# Patient Record
Sex: MALE | Race: BLACK OR AFRICAN AMERICAN | NOT HISPANIC OR LATINO | Employment: FULL TIME | URBAN - METROPOLITAN AREA
[De-identification: names, ages, dates, MRNs, and addresses within clinical notes are randomized per-mention and may not be internally consistent; named-entity substitution may affect disease eponyms.]

---

## 2023-03-10 ENCOUNTER — OFFICE VISIT (OUTPATIENT)
Dept: PRIMARY CARE | Facility: CLINIC | Age: 48
End: 2023-03-10
Payer: COMMERCIAL

## 2023-03-10 VITALS
DIASTOLIC BLOOD PRESSURE: 76 MMHG | BODY MASS INDEX: 50.29 KG/M2 | WEIGHT: 312.9 LBS | HEIGHT: 66 IN | HEART RATE: 72 BPM | SYSTOLIC BLOOD PRESSURE: 118 MMHG

## 2023-03-10 DIAGNOSIS — I10 PRIMARY HYPERTENSION: ICD-10-CM

## 2023-03-10 DIAGNOSIS — E66.01 OBESITY, MORBID, BMI 50 OR HIGHER (MULTI): ICD-10-CM

## 2023-03-10 DIAGNOSIS — R73.03 PREDIABETES: ICD-10-CM

## 2023-03-10 DIAGNOSIS — G47.33 OBSTRUCTIVE SLEEP APNEA SYNDROME: Primary | ICD-10-CM

## 2023-03-10 DIAGNOSIS — E78.49 OTHER HYPERLIPIDEMIA: ICD-10-CM

## 2023-03-10 LAB — POC FINGERSTICK BLOOD GLUCOSE: 121 MG/DL (ref 70–100)

## 2023-03-10 PROCEDURE — 99204 OFFICE O/P NEW MOD 45 MIN: CPT | Performed by: INTERNAL MEDICINE

## 2023-03-10 PROCEDURE — 82962 GLUCOSE BLOOD TEST: CPT | Performed by: INTERNAL MEDICINE

## 2023-03-10 PROCEDURE — 3078F DIAST BP <80 MM HG: CPT | Performed by: INTERNAL MEDICINE

## 2023-03-10 PROCEDURE — 3074F SYST BP LT 130 MM HG: CPT | Performed by: INTERNAL MEDICINE

## 2023-03-10 RX ORDER — LISINOPRIL AND HYDROCHLOROTHIAZIDE 10; 12.5 MG/1; MG/1
1 TABLET ORAL DAILY
COMMUNITY
Start: 2022-12-09 | End: 2023-06-23 | Stop reason: SDUPTHER

## 2023-03-10 RX ORDER — ATORVASTATIN CALCIUM 10 MG/1
10 TABLET, FILM COATED ORAL NIGHTLY
COMMUNITY
Start: 2022-12-09 | End: 2023-06-23 | Stop reason: DRUGHIGH

## 2023-03-10 ASSESSMENT — ENCOUNTER SYMPTOMS
BLOOD IN STOOL: 0
ANAL BLEEDING: 0
DYSURIA: 0
CHEST TIGHTNESS: 0
WEAKNESS: 0
NUMBNESS: 0
EYE REDNESS: 0
ABDOMINAL DISTENTION: 0
EYE ITCHING: 0
CHOKING: 0
TREMORS: 0
FACIAL ASYMMETRY: 0
BRUISES/BLEEDS EASILY: 0
JOINT SWELLING: 0
APPETITE CHANGE: 0
CHILLS: 0
LIGHT-HEADEDNESS: 0
FLANK PAIN: 0
DIAPHORESIS: 0
BACK PAIN: 0
DIARRHEA: 0
FREQUENCY: 0
EYE DISCHARGE: 0
DIFFICULTY URINATING: 0
WOUND: 0
POLYPHAGIA: 0
RHINORRHEA: 0
ACTIVITY CHANGE: 0
POLYDIPSIA: 0
NECK PAIN: 0
SLEEP DISTURBANCE: 0
SINUS PAIN: 0
TROUBLE SWALLOWING: 0
PALPITATIONS: 0
HEMATURIA: 0
SEIZURES: 0
NAUSEA: 0
EYE PAIN: 0
PHOTOPHOBIA: 0
SORE THROAT: 0
FACIAL SWELLING: 0
COUGH: 0
WHEEZING: 0
RECTAL PAIN: 0
SHORTNESS OF BREATH: 0
ADENOPATHY: 0
STRIDOR: 0
MYALGIAS: 0
VOMITING: 0
NECK STIFFNESS: 0
SINUS PRESSURE: 0
ABDOMINAL PAIN: 0
HEADACHES: 0
DIZZINESS: 0
CONSTIPATION: 0
SPEECH DIFFICULTY: 0
FATIGUE: 0
COLOR CHANGE: 0
ARTHRALGIAS: 1
VOICE CHANGE: 0

## 2023-03-10 NOTE — PROGRESS NOTES
Subjective   Patient ID: Humberto Rivas is a 47 y.o. male who presents for Establish Care.    Patient is a 47-year-old male with history of hypertension, hyperlipidemia, sleep apnea,, prediabetes and he overall feels well.  He denies any headaches, no dizziness, sinus problems, no chest pain or shortness of breath.  Denies abdominal pain no nausea vomiting or diarrhea.  Does complain of occasional knee pain.  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  He has been compliant with his medications but not diet or exercise.  He has gained some weight.         Review of Systems   Constitutional:  Negative for activity change, appetite change, chills, diaphoresis and fatigue.   HENT:  Negative for congestion, dental problem, drooling, ear discharge, ear pain, facial swelling, hearing loss, mouth sores, nosebleeds, postnasal drip, rhinorrhea, sinus pressure, sinus pain, sneezing, sore throat, tinnitus, trouble swallowing and voice change.    Eyes:  Negative for photophobia, pain, discharge, redness, itching and visual disturbance.   Respiratory:  Negative for cough, choking, chest tightness, shortness of breath, wheezing and stridor.    Cardiovascular:  Negative for chest pain, palpitations and leg swelling.   Gastrointestinal:  Negative for abdominal distention, abdominal pain, anal bleeding, blood in stool, constipation, diarrhea, nausea, rectal pain and vomiting.   Endocrine: Negative for cold intolerance, heat intolerance, polydipsia, polyphagia and polyuria.   Genitourinary:  Negative for decreased urine volume, difficulty urinating, dysuria, enuresis, flank pain, frequency, genital sores, hematuria and urgency.   Musculoskeletal:  Positive for arthralgias (Left knee pain). Negative for back pain, gait problem, joint swelling, myalgias, neck pain and neck stiffness.   Skin:  Negative for color change, pallor, rash and wound.   Neurological:  Negative for dizziness, tremors, seizures, syncope, facial asymmetry,  "speech difficulty, weakness, light-headedness, numbness and headaches.   Hematological:  Negative for adenopathy. Does not bruise/bleed easily.   Psychiatric/Behavioral:  Negative for sleep disturbance.        Objective   Ht 1.676 m (5' 6\")   Wt (!) 142 kg (312 lb 14.4 oz)   BMI 50.50 kg/m²     Physical Exam  Cardiovascular:      Rate and Rhythm: Normal rate and regular rhythm.      Heart sounds: No murmur heard.     No friction rub. No gallop.   Pulmonary:      Effort: No respiratory distress.      Breath sounds: No wheezing or rales.   Abdominal:      General: There is no distension.      Palpations: There is no mass.      Tenderness: There is no abdominal tenderness. There is no guarding.   Musculoskeletal:      Right lower leg: No edema.      Left lower leg: No edema.   Neurological:      Mental Status: He is alert.         Assessment/Plan   Diagnoses and all orders for this visit:  Obstructive sleep apnea syndrome-he will use a CPAP nightly.  Prediabetes-hemoglobin A1c was 6.9 previously.  He will watch his diet and exercise.  He will consider medications.  Will refer to endocrinology  -     POCT Fingerstick Glucose manually resulted  Primary hypertension-stable on present medications.  Other hyperlipidemia-we will continue with atorvastatin.  We will schedule cardiac score  -     CT cardiac scoring wo IV contrast; Future  Obesity, morbid, BMI 50 or higher (CMS/HCC-we will refer to endocrinology...  Diet and exercise  Health maintenance-colonoscopy has been done.  We will obtain previous immunizations       "

## 2023-03-10 NOTE — PATIENT INSTRUCTIONS
Please take medication as prescribed.  Diet and exercise.  Follow-up in 3 months.  Schedule appointment with endocrinology.  Schedule your calcium score.  Diet and exercise.

## 2023-05-26 LAB
ESTIMATED AVERAGE GLUCOSE FOR HBA1C: 169 MG/DL
HEMOGLOBIN A1C/HEMOGLOBIN TOTAL IN BLOOD: 7.5 %
THYROTROPIN (MIU/L) IN SER/PLAS BY DETECTION LIMIT <= 0.05 MIU/L: 2.15 MIU/L (ref 0.44–3.98)

## 2023-06-23 ENCOUNTER — OFFICE VISIT (OUTPATIENT)
Dept: PRIMARY CARE | Facility: CLINIC | Age: 48
End: 2023-06-23
Payer: COMMERCIAL

## 2023-06-23 ENCOUNTER — LAB (OUTPATIENT)
Dept: LAB | Facility: LAB | Age: 48
End: 2023-06-23
Payer: COMMERCIAL

## 2023-06-23 VITALS
WEIGHT: 302 LBS | DIASTOLIC BLOOD PRESSURE: 78 MMHG | BODY MASS INDEX: 48.74 KG/M2 | SYSTOLIC BLOOD PRESSURE: 114 MMHG | HEART RATE: 72 BPM

## 2023-06-23 DIAGNOSIS — G47.33 OBSTRUCTIVE SLEEP APNEA SYNDROME: ICD-10-CM

## 2023-06-23 DIAGNOSIS — I10 PRIMARY HYPERTENSION: ICD-10-CM

## 2023-06-23 DIAGNOSIS — R73.03 PREDIABETES: ICD-10-CM

## 2023-06-23 DIAGNOSIS — E78.49 OTHER HYPERLIPIDEMIA: Primary | ICD-10-CM

## 2023-06-23 LAB
APPEARANCE, URINE: NORMAL
BILIRUBIN, URINE: NEGATIVE
BLOOD, URINE: NEGATIVE
COLOR, URINE: YELLOW
GLUCOSE, URINE: NEGATIVE MG/DL
KETONES, URINE: NEGATIVE MG/DL
LEUKOCYTE ESTERASE, URINE: NEGATIVE
NITRITE, URINE: NEGATIVE
PH, URINE: 5 (ref 5–8)
PROTEIN, URINE: NEGATIVE MG/DL
SPECIFIC GRAVITY, URINE: 1.03 (ref 1–1.03)
UROBILINOGEN, URINE: <2 MG/DL (ref 0–1.9)

## 2023-06-23 PROCEDURE — 3074F SYST BP LT 130 MM HG: CPT | Performed by: INTERNAL MEDICINE

## 2023-06-23 PROCEDURE — 81003 URINALYSIS AUTO W/O SCOPE: CPT

## 2023-06-23 PROCEDURE — 99396 PREV VISIT EST AGE 40-64: CPT | Performed by: INTERNAL MEDICINE

## 2023-06-23 PROCEDURE — 4004F PT TOBACCO SCREEN RCVD TLK: CPT | Performed by: INTERNAL MEDICINE

## 2023-06-23 PROCEDURE — 93000 ELECTROCARDIOGRAM COMPLETE: CPT | Performed by: INTERNAL MEDICINE

## 2023-06-23 PROCEDURE — 3078F DIAST BP <80 MM HG: CPT | Performed by: INTERNAL MEDICINE

## 2023-06-23 RX ORDER — ATORVASTATIN CALCIUM 20 MG/1
20 TABLET, FILM COATED ORAL DAILY
Qty: 90 TABLET | Refills: 3 | Status: SHIPPED | OUTPATIENT
Start: 2023-06-23 | End: 2024-06-17

## 2023-06-23 RX ORDER — LISINOPRIL AND HYDROCHLOROTHIAZIDE 10; 12.5 MG/1; MG/1
1 TABLET ORAL DAILY
Qty: 90 TABLET | Refills: 3 | Status: SHIPPED | OUTPATIENT
Start: 2023-06-23 | End: 2023-10-06 | Stop reason: WASHOUT

## 2023-06-23 RX ORDER — POTASSIUM CHLORIDE 750 MG/1
10 TABLET, EXTENDED RELEASE ORAL 2 TIMES DAILY
COMMUNITY
Start: 2023-05-26 | End: 2023-12-30

## 2023-06-23 ASSESSMENT — ENCOUNTER SYMPTOMS
EYE DISCHARGE: 0
DECREASED CONCENTRATION: 0
FREQUENCY: 0
FACIAL ASYMMETRY: 0
CONSTIPATION: 0
BACK PAIN: 0
FATIGUE: 0
JOINT SWELLING: 0
HEADACHES: 0
VOICE CHANGE: 0
VOMITING: 0
APPETITE CHANGE: 0
DIARRHEA: 0
NAUSEA: 0
EYE REDNESS: 0
ACTIVITY CHANGE: 0
POLYPHAGIA: 0
DIFFICULTY URINATING: 0
AGITATION: 0
CHEST TIGHTNESS: 0
DIZZINESS: 0
CHOKING: 0
WOUND: 0
DYSURIA: 0
EYE PAIN: 0
ANAL BLEEDING: 0
STRIDOR: 0
COUGH: 0
SINUS PRESSURE: 0
RHINORRHEA: 0
DIAPHORESIS: 0
HEMATURIA: 0
BRUISES/BLEEDS EASILY: 0
SEIZURES: 0
PHOTOPHOBIA: 0
WEAKNESS: 0
FACIAL SWELLING: 0
ADENOPATHY: 0
SHORTNESS OF BREATH: 0
NECK STIFFNESS: 0
SINUS PAIN: 0
SLEEP DISTURBANCE: 0
RECTAL PAIN: 0
TREMORS: 0
ABDOMINAL DISTENTION: 0
LIGHT-HEADEDNESS: 0
COLOR CHANGE: 0
SPEECH DIFFICULTY: 0
FLANK PAIN: 0
WHEEZING: 0
NECK PAIN: 0
ARTHRALGIAS: 0
PALPITATIONS: 0
TROUBLE SWALLOWING: 0
MYALGIAS: 0
POLYDIPSIA: 0
NUMBNESS: 0
BLOOD IN STOOL: 0
SORE THROAT: 0
EYE ITCHING: 0
CHILLS: 0
ABDOMINAL PAIN: 0

## 2023-06-23 ASSESSMENT — PROMIS GLOBAL HEALTH SCALE
RATE_AVERAGE_PAIN: 2
RATE_AVERAGE_FATIGUE: MILD
RATE_QUALITY_OF_LIFE: EXCELLENT
CARRYOUT_SOCIAL_ACTIVITIES: EXCELLENT
EMOTIONAL_PROBLEMS: NEVER
RATE_GENERAL_HEALTH: GOOD
CARRYOUT_PHYSICAL_ACTIVITIES: COMPLETELY
RATE_SOCIAL_SATISFACTION: EXCELLENT
RATE_PHYSICAL_HEALTH: GOOD
RATE_MENTAL_HEALTH: EXCELLENT

## 2023-06-23 NOTE — PROGRESS NOTES
Subjective   Patient ID: Humberto Rivas is a 47 y.o. male who presents for Annual Exam.    Patient presents for physical exam.  He has been compliant with his medications, diet and exercise.  He has purposely lost weight.  He overall feels well.  He denies any headaches, no dizziness, no sinus problems, no chest pain or shortness of breath.  He denies abdominal pain no nausea vomiting or diarrhea.  He reports no new musculoskeletal complaints.  He is using his CPAP nightly.         Review of Systems   Constitutional:  Negative for activity change, appetite change, chills, diaphoresis and fatigue.   HENT:  Negative for congestion, dental problem, drooling, ear discharge, ear pain, facial swelling, hearing loss, mouth sores, nosebleeds, postnasal drip, rhinorrhea, sinus pressure, sinus pain, sneezing, sore throat, tinnitus, trouble swallowing and voice change.    Eyes:  Negative for photophobia, pain, discharge, redness, itching and visual disturbance.   Respiratory:  Negative for cough, choking, chest tightness, shortness of breath, wheezing and stridor.    Cardiovascular:  Negative for chest pain, palpitations and leg swelling.   Gastrointestinal:  Negative for abdominal distention, abdominal pain, anal bleeding, blood in stool, constipation, diarrhea, nausea, rectal pain and vomiting.   Endocrine: Negative for cold intolerance, heat intolerance, polydipsia, polyphagia and polyuria.   Genitourinary:  Negative for decreased urine volume, difficulty urinating, dysuria, enuresis, flank pain, frequency, genital sores, hematuria and urgency.   Musculoskeletal:  Negative for arthralgias, back pain, gait problem, joint swelling, myalgias, neck pain and neck stiffness.   Skin:  Negative for color change, pallor, rash and wound.   Neurological:  Negative for dizziness, tremors, seizures, syncope, facial asymmetry, speech difficulty, weakness, light-headedness, numbness and headaches.   Hematological:  Negative for  adenopathy. Does not bruise/bleed easily.   Psychiatric/Behavioral:  Negative for agitation, decreased concentration and sleep disturbance.        Objective   Wt 137 kg (302 lb)   BMI 48.74 kg/m²     Physical Exam  Constitutional:       General: He is not in acute distress.     Appearance: Normal appearance. He is obese. He is not ill-appearing, toxic-appearing or diaphoretic.   HENT:      Head: Normocephalic.      Right Ear: Tympanic membrane, ear canal and external ear normal. There is no impacted cerumen.      Left Ear: Tympanic membrane and ear canal normal. There is no impacted cerumen.      Nose: Nose normal. No congestion or rhinorrhea.      Mouth/Throat:      Pharynx: No oropharyngeal exudate or posterior oropharyngeal erythema.   Eyes:      General: No scleral icterus.        Right eye: No discharge.         Left eye: No discharge.   Neck:      Vascular: No carotid bruit.   Cardiovascular:      Rate and Rhythm: Normal rate and regular rhythm.      Pulses: Normal pulses.      Heart sounds: Normal heart sounds. No murmur heard.     No friction rub. No gallop.   Pulmonary:      Effort: Pulmonary effort is normal. No respiratory distress.      Breath sounds: Normal breath sounds. No stridor. No wheezing, rhonchi or rales.   Chest:      Chest wall: No tenderness.   Abdominal:      General: Abdomen is flat. There is no distension.      Palpations: Abdomen is soft. There is no mass.      Tenderness: There is no abdominal tenderness. There is no right CVA tenderness, left CVA tenderness or guarding.      Hernia: No hernia is present.   Musculoskeletal:         General: No swelling, tenderness, deformity or signs of injury.      Cervical back: Normal range of motion and neck supple. No rigidity or tenderness.      Right lower leg: No edema.      Left lower leg: No edema.   Lymphadenopathy:      Cervical: No cervical adenopathy.   Skin:     Coloration: Skin is not jaundiced or pale.      Findings: No bruising,  erythema, lesion or rash.   Neurological:      General: No focal deficit present.      Mental Status: He is alert. Mental status is at baseline. He is disoriented.      Cranial Nerves: No cranial nerve deficit.      Sensory: No sensory deficit.      Motor: No weakness.      Coordination: Coordination normal.      Gait: Gait normal.      Deep Tendon Reflexes: Reflexes normal.   Psychiatric:         Mood and Affect: Mood normal.         Behavior: Behavior normal.         Thought Content: Thought content normal.         Judgment: Judgment normal.         Assessment/Plan   Diagnoses and all orders for this visit:  Other hyperlipidemia--we will increase atorvastatin to 20 mg daily.  LDL goal less than 70.  -     atorvastatin (Lipitor) 20 mg tablet; Take 1 tablet (20 mg) by mouth once daily.  Primary hypertension-stable on present medications  -     Urinalysis with Reflex Microscopic; Future  -     lisinopriL-hydrochlorothiazide 10-12.5 mg tablet; Take 1 tablet by mouth once daily.  -     ECG 12 lead  Prediabetes-we will continue with present management.  We will follow-up with endocrinology.  Obstructive sleep apnea syndrome-he will use a CPAP nightly.  Obesity-patient congratulated on his weight loss.  He will continue with diet and exercise.  Health maintenance-colonoscopy has been done.  We will obtain previous immunizations

## 2023-07-21 LAB
ALANINE AMINOTRANSFERASE (SGPT) (U/L) IN SER/PLAS: 30 U/L (ref 10–52)
ALBUMIN (G/DL) IN SER/PLAS: 4.7 G/DL (ref 3.4–5)
ALKALINE PHOSPHATASE (U/L) IN SER/PLAS: 82 U/L (ref 33–120)
ANION GAP IN SER/PLAS: 14 MMOL/L (ref 10–20)
ASPARTATE AMINOTRANSFERASE (SGOT) (U/L) IN SER/PLAS: 20 U/L (ref 9–39)
BILIRUBIN TOTAL (MG/DL) IN SER/PLAS: 0.8 MG/DL (ref 0–1.2)
CALCIUM (MG/DL) IN SER/PLAS: 9.5 MG/DL (ref 8.6–10.6)
CARBON DIOXIDE, TOTAL (MMOL/L) IN SER/PLAS: 25 MMOL/L (ref 21–32)
CHLORIDE (MMOL/L) IN SER/PLAS: 104 MMOL/L (ref 98–107)
CREATININE (MG/DL) IN SER/PLAS: 0.88 MG/DL (ref 0.5–1.3)
GFR MALE: >90 ML/MIN/1.73M2
GLUCOSE (MG/DL) IN SER/PLAS: 94 MG/DL (ref 74–99)
MAGNESIUM (MG/DL) IN SER/PLAS: 1.95 MG/DL (ref 1.6–2.4)
POTASSIUM (MMOL/L) IN SER/PLAS: 4.5 MMOL/L (ref 3.5–5.3)
PROTEIN TOTAL: 7.3 G/DL (ref 6.4–8.2)
SODIUM (MMOL/L) IN SER/PLAS: 138 MMOL/L (ref 136–145)
URATE (MG/DL) IN SER/PLAS: 5.7 MG/DL (ref 4–7.5)
UREA NITROGEN (MG/DL) IN SER/PLAS: 20 MG/DL (ref 6–23)

## 2023-09-22 PROBLEM — G89.29 CHRONIC PAIN OF LEFT KNEE: Status: ACTIVE | Noted: 2020-10-09

## 2023-09-22 PROBLEM — E11.9 DIABETES MELLITUS (MULTI): Status: ACTIVE | Noted: 2023-09-22

## 2023-09-22 PROBLEM — M25.562 CHRONIC PAIN OF LEFT KNEE: Status: ACTIVE | Noted: 2020-10-09

## 2023-09-22 PROBLEM — E66.9 OBESITY (BMI 30-39.9): Status: ACTIVE | Noted: 2023-09-22

## 2023-09-29 ENCOUNTER — APPOINTMENT (OUTPATIENT)
Dept: PRIMARY CARE | Facility: CLINIC | Age: 48
End: 2023-09-29
Payer: COMMERCIAL

## 2023-10-06 ENCOUNTER — OFFICE VISIT (OUTPATIENT)
Dept: ENDOCRINOLOGY | Facility: CLINIC | Age: 48
End: 2023-10-06
Payer: COMMERCIAL

## 2023-10-06 ENCOUNTER — OFFICE VISIT (OUTPATIENT)
Dept: PRIMARY CARE | Facility: CLINIC | Age: 48
End: 2023-10-06
Payer: COMMERCIAL

## 2023-10-06 VITALS — WEIGHT: 275.4 LBS | BODY MASS INDEX: 43.13 KG/M2

## 2023-10-06 DIAGNOSIS — E78.49 OTHER HYPERLIPIDEMIA: Primary | ICD-10-CM

## 2023-10-06 DIAGNOSIS — I10 HYPERTENSION, UNSPECIFIED TYPE: ICD-10-CM

## 2023-10-06 DIAGNOSIS — E11.9 TYPE 2 DIABETES MELLITUS WITHOUT COMPLICATION, WITHOUT LONG-TERM CURRENT USE OF INSULIN (MULTI): Primary | ICD-10-CM

## 2023-10-06 DIAGNOSIS — G47.33 OBSTRUCTIVE SLEEP APNEA SYNDROME: ICD-10-CM

## 2023-10-06 DIAGNOSIS — E78.49 OTHER HYPERLIPIDEMIA: ICD-10-CM

## 2023-10-06 DIAGNOSIS — I10 PRIMARY HYPERTENSION: ICD-10-CM

## 2023-10-06 DIAGNOSIS — E11.9 TYPE 2 DIABETES MELLITUS WITHOUT COMPLICATION, WITHOUT LONG-TERM CURRENT USE OF INSULIN (MULTI): ICD-10-CM

## 2023-10-06 DIAGNOSIS — E66.01 CLASS 3 SEVERE OBESITY DUE TO EXCESS CALORIES WITHOUT SERIOUS COMORBIDITY WITH BODY MASS INDEX (BMI) OF 40.0 TO 44.9 IN ADULT (MULTI): ICD-10-CM

## 2023-10-06 LAB — POC HEMOGLOBIN A1C: 5.4 % (ref 4.2–6.5)

## 2023-10-06 PROCEDURE — 3008F BODY MASS INDEX DOCD: CPT | Performed by: INTERNAL MEDICINE

## 2023-10-06 PROCEDURE — 4010F ACE/ARB THERAPY RXD/TAKEN: CPT | Performed by: INTERNAL MEDICINE

## 2023-10-06 PROCEDURE — 3051F HG A1C>EQUAL 7.0%<8.0%: CPT | Performed by: INTERNAL MEDICINE

## 2023-10-06 PROCEDURE — 4004F PT TOBACCO SCREEN RCVD TLK: CPT | Performed by: INTERNAL MEDICINE

## 2023-10-06 PROCEDURE — 83036 HEMOGLOBIN GLYCOSYLATED A1C: CPT | Performed by: INTERNAL MEDICINE

## 2023-10-06 PROCEDURE — 99213 OFFICE O/P EST LOW 20 MIN: CPT | Performed by: INTERNAL MEDICINE

## 2023-10-06 PROCEDURE — 99214 OFFICE O/P EST MOD 30 MIN: CPT | Performed by: INTERNAL MEDICINE

## 2023-10-06 RX ORDER — LISINOPRIL 10 MG/1
10 TABLET ORAL DAILY
Qty: 90 TABLET | Refills: 3 | Status: SHIPPED | OUTPATIENT
Start: 2023-10-06 | End: 2024-09-30

## 2023-10-06 SDOH — HEALTH STABILITY: PHYSICAL HEALTH: ON AVERAGE, HOW MANY DAYS PER WEEK DO YOU ENGAGE IN MODERATE TO STRENUOUS EXERCISE (LIKE A BRISK WALK)?: 0 DAYS

## 2023-10-06 SDOH — HEALTH STABILITY: PHYSICAL HEALTH: ON AVERAGE, HOW MANY MINUTES DO YOU ENGAGE IN EXERCISE AT THIS LEVEL?: 0 MIN

## 2023-10-06 ASSESSMENT — ENCOUNTER SYMPTOMS
BLOOD IN STOOL: 0
HEMATURIA: 0
LIGHT-HEADEDNESS: 0
VOICE CHANGE: 0
JOINT SWELLING: 0
EYE DISCHARGE: 0
DIAPHORESIS: 0
FACIAL SWELLING: 0
NECK STIFFNESS: 0
SLEEP DISTURBANCE: 0
FREQUENCY: 0
BRUISES/BLEEDS EASILY: 0
CHILLS: 0
NUMBNESS: 0
RHINORRHEA: 0
WOUND: 0
EYE REDNESS: 0
ANAL BLEEDING: 0
HEADACHES: 0
SEIZURES: 0
EYE ITCHING: 0
TROUBLE SWALLOWING: 0
RECTAL PAIN: 0
FLANK PAIN: 0
CHEST TIGHTNESS: 0
MYALGIAS: 0
SORE THROAT: 0
POLYPHAGIA: 0
ABDOMINAL DISTENTION: 0
COUGH: 0
CHOKING: 0
APPETITE CHANGE: 0
SHORTNESS OF BREATH: 0
ACTIVITY CHANGE: 0
COLOR CHANGE: 0
DIZZINESS: 0
NAUSEA: 0
ABDOMINAL PAIN: 0
TREMORS: 0
SPEECH DIFFICULTY: 0
WEAKNESS: 0
PHOTOPHOBIA: 0
DIARRHEA: 0
VOMITING: 0
NECK PAIN: 0
ARTHRALGIAS: 0
SINUS PAIN: 0
SINUS PRESSURE: 0
POLYDIPSIA: 0
FACIAL ASYMMETRY: 0
BACK PAIN: 0
DIFFICULTY URINATING: 0
DYSURIA: 0
FATIGUE: 0
WHEEZING: 0
CONSTIPATION: 0
STRIDOR: 0
EYE PAIN: 0
ADENOPATHY: 0
PALPITATIONS: 0

## 2023-10-06 ASSESSMENT — LIFESTYLE VARIABLES
HOW OFTEN DO YOU HAVE SIX OR MORE DRINKS ON ONE OCCASION: NEVER
HOW MANY STANDARD DRINKS CONTAINING ALCOHOL DO YOU HAVE ON A TYPICAL DAY: 1 OR 2
AUDIT-C TOTAL SCORE: 1
HOW OFTEN DO YOU HAVE A DRINK CONTAINING ALCOHOL: MONTHLY OR LESS
SKIP TO QUESTIONS 9-10: 1

## 2023-10-06 ASSESSMENT — PAIN SCALES - GENERAL: PAINLEVEL: 0-NO PAIN

## 2023-10-06 ASSESSMENT — PATIENT HEALTH QUESTIONNAIRE - PHQ9
2. FEELING DOWN, DEPRESSED OR HOPELESS: NOT AT ALL
1. LITTLE INTEREST OR PLEASURE IN DOING THINGS: NOT AT ALL
SUM OF ALL RESPONSES TO PHQ9 QUESTIONS 1 & 2: 0

## 2023-10-06 NOTE — PROGRESS NOTES
Patient ID: Humbetro Rivas is a 47 y.o. male who presents for Follow-up.  HPI  The patient comes in for follow up.    He is on [facility] for management of type 2 diabetes hypertension hyperlipidemia sleep apnea on CPAP DJD and edema.    He has had no difficulties with the program to this point.    His lisinopril HCT was just decreased to lisinopril 10 mg.    He had a hemoglobin A1c done in his primary care doctor's office that was 5.4%.    Physically he has no complaints.      ROS  Comprehensive review of systems is negative.    Objective   Physical Exam  Weight 275 down 17 pounds for a total of 45    ENT normal. No adenopathy  Fundi normal  Thyroid palpable and normal. No nodules  Chest clear to auscultation  Heart sounds are normal  Abdomen nontender. Bowel sounds normal. No organomegaly  Feet are okay  Normal vibration and monofilament sensation normal pulses, no lesions    Assessment/Plan     1.  Type 2 diabetes  2.  Hypertension  3.  Hyperlipidemia  4.  Sleep apnea    He is going to work to stay on track.  We discussed strategies for success. Planning ahead, taking things a week at a time and planning the week ahead of time.    He will watch for lightheadedness and adjust the lisinopril further.    He will make sure he keeps up with fluids.    We will hold on blood work today.    Follow up with the nutritionist in 1 month, me in 2 months, sooner as needed.

## 2023-10-06 NOTE — PROGRESS NOTES
Subjective   Patient ID: Humberto Rivas is a 47 y.o. male who presents for Follow-up.    HPI     Review of Systems    Objective   There were no vitals taken for this visit.    Physical Exam    Assessment/Plan           03-Dec-2022 22:28

## 2023-10-06 NOTE — PROGRESS NOTES
Subjective   Patient ID: Humberto Rivas is a 47 y.o. male who presents for Follow-up.    Patient presents for follow-up.  He has been compliant with his medications, diet but not exercise.  He overall feels well.  He denies any headaches, no dizziness, no chest pain or shortness of breath.  He denies abdominal pain no nausea vomiting or diarrhea.  He reports no new musculoskeletal complaints.         Review of Systems   Constitutional:  Negative for activity change, appetite change, chills, diaphoresis and fatigue.   HENT:  Negative for congestion, dental problem, drooling, ear discharge, ear pain, facial swelling, hearing loss, mouth sores, nosebleeds, postnasal drip, rhinorrhea, sinus pressure, sinus pain, sneezing, sore throat, tinnitus, trouble swallowing and voice change.    Eyes:  Negative for photophobia, pain, discharge, redness, itching and visual disturbance.   Respiratory:  Negative for cough, choking, chest tightness, shortness of breath, wheezing and stridor.    Cardiovascular:  Negative for chest pain, palpitations and leg swelling.   Gastrointestinal:  Negative for abdominal distention, abdominal pain, anal bleeding, blood in stool, constipation, diarrhea, nausea, rectal pain and vomiting.   Endocrine: Negative for cold intolerance, heat intolerance, polydipsia, polyphagia and polyuria.   Genitourinary:  Negative for decreased urine volume, difficulty urinating, dysuria, enuresis, flank pain, frequency, genital sores, hematuria and urgency.   Musculoskeletal:  Negative for arthralgias, back pain, gait problem, joint swelling, myalgias, neck pain and neck stiffness.   Skin:  Negative for color change, pallor, rash and wound.   Neurological:  Negative for dizziness, tremors, seizures, syncope, facial asymmetry, speech difficulty, weakness, light-headedness, numbness and headaches.   Hematological:  Negative for adenopathy. Does not bruise/bleed easily.   Psychiatric/Behavioral:  Negative for sleep  disturbance.        Objective   Wt 125 kg (275 lb 6.4 oz)   BMI 43.13 kg/m²     Physical Exam  Constitutional:       Appearance: Normal appearance.   Cardiovascular:      Rate and Rhythm: Normal rate and regular rhythm.      Heart sounds: No murmur heard.     No gallop.   Pulmonary:      Effort: No respiratory distress.      Breath sounds: No wheezing or rales.   Abdominal:      General: There is no distension.      Palpations: There is no mass.      Tenderness: There is no abdominal tenderness. There is no guarding.   Musculoskeletal:      Right lower leg: No edema.      Left lower leg: No edema.   Neurological:      Mental Status: He is alert.         Assessment/Plan   Diagnoses and all orders for this visit:  Other hyperlipidemia-we will continue with atorvastatin  Type 2 diabetes mellitus without complication, without long-term current use of insulin (CMS/[facility])-hemoglobin A1c was 5.4.  We will continue with present management well 5.4.  He will watch his diet and exercise.  Follow-up with endocrinology  Primary hypertension-with change of diet blood pressure has been running low.  Will DC hydrochlorothiazide will DC hydrochlorothiazide.  We will just be on daily.  -     lisinopril 10 mg tablet; Take 1 tablet (10 mg) by mouth once daily.  Obstructive sleep apnea syndrome  Health maintenance-colonoscopy has been done.  Refuses immunization.

## 2023-12-01 ENCOUNTER — OFFICE VISIT (OUTPATIENT)
Dept: ENDOCRINOLOGY | Facility: CLINIC | Age: 48
End: 2023-12-01
Payer: COMMERCIAL

## 2023-12-01 ENCOUNTER — OFFICE VISIT (OUTPATIENT)
Dept: PRIMARY CARE | Facility: CLINIC | Age: 48
End: 2023-12-01
Payer: COMMERCIAL

## 2023-12-01 VITALS
HEART RATE: 72 BPM | BODY MASS INDEX: 43.38 KG/M2 | SYSTOLIC BLOOD PRESSURE: 110 MMHG | WEIGHT: 277 LBS | DIASTOLIC BLOOD PRESSURE: 76 MMHG

## 2023-12-01 VITALS — DIASTOLIC BLOOD PRESSURE: 78 MMHG | BODY MASS INDEX: 43.7 KG/M2 | WEIGHT: 279 LBS | SYSTOLIC BLOOD PRESSURE: 122 MMHG

## 2023-12-01 DIAGNOSIS — I10 HYPERTENSION, UNSPECIFIED TYPE: Primary | ICD-10-CM

## 2023-12-01 DIAGNOSIS — G47.33 OBSTRUCTIVE SLEEP APNEA SYNDROME: ICD-10-CM

## 2023-12-01 DIAGNOSIS — I10 HYPERTENSION, UNSPECIFIED TYPE: ICD-10-CM

## 2023-12-01 DIAGNOSIS — E78.49 OTHER HYPERLIPIDEMIA: ICD-10-CM

## 2023-12-01 DIAGNOSIS — I10 BENIGN ESSENTIAL HYPERTENSION: ICD-10-CM

## 2023-12-01 DIAGNOSIS — E11.9 TYPE 2 DIABETES MELLITUS WITHOUT COMPLICATION, WITHOUT LONG-TERM CURRENT USE OF INSULIN (MULTI): Primary | ICD-10-CM

## 2023-12-01 DIAGNOSIS — E11.9 TYPE 2 DIABETES MELLITUS WITHOUT COMPLICATION, WITHOUT LONG-TERM CURRENT USE OF INSULIN (MULTI): ICD-10-CM

## 2023-12-01 PROBLEM — R73.03 PREDIABETES: Status: RESOLVED | Noted: 2023-03-10 | Resolved: 2023-12-01

## 2023-12-01 PROCEDURE — 3008F BODY MASS INDEX DOCD: CPT | Performed by: INTERNAL MEDICINE

## 2023-12-01 PROCEDURE — 3051F HG A1C>EQUAL 7.0%<8.0%: CPT | Performed by: INTERNAL MEDICINE

## 2023-12-01 PROCEDURE — 4010F ACE/ARB THERAPY RXD/TAKEN: CPT | Performed by: INTERNAL MEDICINE

## 2023-12-01 PROCEDURE — 3074F SYST BP LT 130 MM HG: CPT | Performed by: INTERNAL MEDICINE

## 2023-12-01 PROCEDURE — 4004F PT TOBACCO SCREEN RCVD TLK: CPT | Performed by: INTERNAL MEDICINE

## 2023-12-01 PROCEDURE — 3078F DIAST BP <80 MM HG: CPT | Performed by: INTERNAL MEDICINE

## 2023-12-01 PROCEDURE — 99214 OFFICE O/P EST MOD 30 MIN: CPT | Performed by: INTERNAL MEDICINE

## 2023-12-01 PROCEDURE — 99213 OFFICE O/P EST LOW 20 MIN: CPT | Performed by: INTERNAL MEDICINE

## 2023-12-01 ASSESSMENT — ENCOUNTER SYMPTOMS
EYE DISCHARGE: 0
SINUS PAIN: 0
SORE THROAT: 0
HEADACHES: 0
FREQUENCY: 0
EYE ITCHING: 0
NECK STIFFNESS: 0
WOUND: 0
DIFFICULTY URINATING: 0
FACIAL SWELLING: 0
DIZZINESS: 0
BLOOD IN STOOL: 0
FLANK PAIN: 0
DIAPHORESIS: 0
NECK PAIN: 0
ACTIVITY CHANGE: 0
NAUSEA: 0
CHEST TIGHTNESS: 0
POLYDIPSIA: 0
POLYPHAGIA: 0
WHEEZING: 0
SPEECH DIFFICULTY: 0
JOINT SWELLING: 0
VOICE CHANGE: 0
BACK PAIN: 0
MYALGIAS: 0
SEIZURES: 0
COUGH: 0
ABDOMINAL DISTENTION: 0
ANAL BLEEDING: 0
FACIAL ASYMMETRY: 0
BRUISES/BLEEDS EASILY: 0
HEMATURIA: 0
VOMITING: 0
PALPITATIONS: 0
TROUBLE SWALLOWING: 0
SHORTNESS OF BREATH: 0
ABDOMINAL PAIN: 0
PHOTOPHOBIA: 0
TREMORS: 0
SINUS PRESSURE: 0
CHILLS: 0
NUMBNESS: 0
COLOR CHANGE: 0
EYE PAIN: 0
RECTAL PAIN: 0
LIGHT-HEADEDNESS: 0
FATIGUE: 0
SLEEP DISTURBANCE: 0
DIARRHEA: 0
STRIDOR: 0
WEAKNESS: 0
ADENOPATHY: 0
CONSTIPATION: 0
APPETITE CHANGE: 0
ARTHRALGIAS: 0
RHINORRHEA: 0
EYE REDNESS: 0
DYSURIA: 0
CHOKING: 0

## 2023-12-01 ASSESSMENT — PAIN SCALES - GENERAL: PAINLEVEL: 0-NO PAIN

## 2023-12-01 NOTE — PROGRESS NOTES
Patient ID: Humberto Rivas is a 48 y.o. male who presents for Follow-up.  HPI  he patient comes in for follow up.    [facility] of type 2 diabetes hypertension hyperlipidemia sleep apnea on CPAP DJD and edema.    He has had no difficulties with the program to this point.    He got off track for his birthday on Thanksgiving but is back on track.    Physically he has no complaints.    ROS  Comprehensive review of systems is negative.    Objective   Physical Exam  Visit Vitals  /78      Vitals:    12/01/23 1249   Weight: 127 kg (279 lb)      Body mass index is 43.7 kg/m².      Weight 279 up 4 pounds still down 41    ENT normal. No adenopathy  Fundi normal  Thyroid palpable and normal. No nodules  Chest clear to auscultation  Heart sounds are normal  Abdomen nontender. Bowel sounds normal. No organomegaly  Feet are okay  Normal vibration and monofilament sensation normal pulses, no lesions    Assessment/Plan     1.  Type 2 diabetes  2.  Hypertension  3.  Hyperlipidemia  4.  Sleep apnea    He is going to work to stay on track.  We discussed strategies for success. Planning ahead, taking things a week at a time and planning the week ahead of time.    We discussed strategies with regards to the holidays.    He will make sure he keeps up with fluids.    We will hold on blood work today.    Follow up with the nutritionist in 1 month, me in 2 months, sooner as needed.

## 2023-12-01 NOTE — PROGRESS NOTES
Subjective   Patient ID: Humberto Rivas is a 48 y.o. male who presents for Follow-up.    Patient presents for follow-up.  He has been compliant with his medications, diet but not exercise.  He overall feels well.  Denies any headaches, no dizziness, no chest pain or shortness of breath.  He denies abdominal pain no nausea vomiting or diarrhea.  He reports no new musculoskeletal complaints.         Review of Systems   Constitutional:  Negative for activity change, appetite change, chills, diaphoresis and fatigue.   HENT:  Negative for congestion, dental problem, drooling, ear discharge, ear pain, facial swelling, hearing loss, mouth sores, nosebleeds, postnasal drip, rhinorrhea, sinus pressure, sinus pain, sneezing, sore throat, tinnitus, trouble swallowing and voice change.    Eyes:  Negative for photophobia, pain, discharge, redness, itching and visual disturbance.   Respiratory:  Negative for cough, choking, chest tightness, shortness of breath, wheezing and stridor.    Cardiovascular:  Negative for chest pain, palpitations and leg swelling.   Gastrointestinal:  Negative for abdominal distention, abdominal pain, anal bleeding, blood in stool, constipation, diarrhea, nausea, rectal pain and vomiting.   Endocrine: Negative for cold intolerance, heat intolerance, polydipsia, polyphagia and polyuria.   Genitourinary:  Negative for decreased urine volume, difficulty urinating, dysuria, enuresis, flank pain, frequency, genital sores, hematuria and urgency.   Musculoskeletal:  Negative for arthralgias, back pain, gait problem, joint swelling, myalgias, neck pain and neck stiffness.   Skin:  Negative for color change, pallor, rash and wound.   Neurological:  Negative for dizziness, tremors, seizures, syncope, facial asymmetry, speech difficulty, weakness, light-headedness, numbness and headaches.   Hematological:  Negative for adenopathy. Does not bruise/bleed easily.   Psychiatric/Behavioral:  Negative for sleep  disturbance.        Objective   /76   Pulse 72   Wt 126 kg (277 lb)   BMI 43.38 kg/m²     Physical Exam  Constitutional:       Appearance: Normal appearance.   Cardiovascular:      Rate and Rhythm: Normal rate and regular rhythm.      Heart sounds: No murmur heard.     No gallop.   Pulmonary:      Effort: No respiratory distress.      Breath sounds: No wheezing or rales.   Abdominal:      General: There is no distension.      Palpations: There is no mass.      Tenderness: There is no abdominal tenderness. There is no guarding.   Musculoskeletal:      Right lower leg: No edema.      Left lower leg: No edema.   Neurological:      Mental Status: He is alert.         Assessment/Plan   Diagnoses and all orders for this visit:  Hypertension, unspecified type-low-salt diet and exercise  Type 2 diabetes mellitus without complication, without long-term current use of insulin (CMS/[facility])  -     POCT glycosylated hemoglobin (Hb A1C) manually resulted  Other hyperlipidemia-we will continue with atorvastatin  Benign essential hypertension-stable on present medications.  Obesity-he will diet and exercise.  Follow-up with endocrinology.  Health maintenance-colonoscopy has been done.  Refuses immunizations

## 2023-12-26 ENCOUNTER — TELEMEDICINE CLINICAL SUPPORT (OUTPATIENT)
Dept: ENDOCRINOLOGY | Facility: CLINIC | Age: 48
End: 2023-12-26
Payer: COMMERCIAL

## 2023-12-26 VITALS — BODY MASS INDEX: 43.7 KG/M2 | WEIGHT: 279 LBS

## 2023-12-30 DIAGNOSIS — E11.9 TYPE 2 DIABETES MELLITUS WITHOUT COMPLICATION, WITHOUT LONG-TERM CURRENT USE OF INSULIN (MULTI): Primary | ICD-10-CM

## 2023-12-30 RX ORDER — POTASSIUM CHLORIDE 750 MG/1
10 TABLET, EXTENDED RELEASE ORAL 2 TIMES DAILY
Qty: 60 TABLET | Refills: 3 | Status: SHIPPED | OUTPATIENT
Start: 2023-12-30 | End: 2024-04-23 | Stop reason: SDUPTHER

## 2024-01-18 ENCOUNTER — TELEPHONE (OUTPATIENT)
Dept: PRIMARY CARE | Facility: CLINIC | Age: 49
End: 2024-01-18
Payer: COMMERCIAL

## 2024-02-13 ENCOUNTER — APPOINTMENT (OUTPATIENT)
Dept: ENDOCRINOLOGY | Facility: CLINIC | Age: 49
End: 2024-02-13
Payer: COMMERCIAL

## 2024-03-01 ENCOUNTER — OFFICE VISIT (OUTPATIENT)
Dept: PRIMARY CARE | Facility: CLINIC | Age: 49
End: 2024-03-01
Payer: COMMERCIAL

## 2024-03-01 ENCOUNTER — OFFICE VISIT (OUTPATIENT)
Dept: ENDOCRINOLOGY | Facility: CLINIC | Age: 49
End: 2024-03-01
Payer: COMMERCIAL

## 2024-03-01 VITALS
DIASTOLIC BLOOD PRESSURE: 76 MMHG | SYSTOLIC BLOOD PRESSURE: 120 MMHG | BODY MASS INDEX: 43.07 KG/M2 | HEART RATE: 72 BPM | WEIGHT: 275 LBS

## 2024-03-01 VITALS — BODY MASS INDEX: 44.79 KG/M2 | SYSTOLIC BLOOD PRESSURE: 122 MMHG | DIASTOLIC BLOOD PRESSURE: 72 MMHG | WEIGHT: 286 LBS

## 2024-03-01 DIAGNOSIS — I10 HYPERTENSION, UNSPECIFIED TYPE: Primary | ICD-10-CM

## 2024-03-01 DIAGNOSIS — E78.49 OTHER HYPERLIPIDEMIA: ICD-10-CM

## 2024-03-01 DIAGNOSIS — I10 BENIGN ESSENTIAL HYPERTENSION: ICD-10-CM

## 2024-03-01 DIAGNOSIS — G47.33 OBSTRUCTIVE SLEEP APNEA SYNDROME: ICD-10-CM

## 2024-03-01 DIAGNOSIS — Z23 NEED FOR TETANUS BOOSTER: ICD-10-CM

## 2024-03-01 DIAGNOSIS — E11.9 TYPE 2 DIABETES MELLITUS WITHOUT COMPLICATION, WITHOUT LONG-TERM CURRENT USE OF INSULIN (MULTI): ICD-10-CM

## 2024-03-01 DIAGNOSIS — E66.9 OBESITY (BMI 30-39.9): ICD-10-CM

## 2024-03-01 DIAGNOSIS — Z00.00 HEALTH CARE MAINTENANCE: Primary | ICD-10-CM

## 2024-03-01 DIAGNOSIS — I10 HYPERTENSION, UNSPECIFIED TYPE: ICD-10-CM

## 2024-03-01 LAB — POC HEMOGLOBIN: 5.9 G/DL (ref 4.6–6.5)

## 2024-03-01 PROCEDURE — 90471 IMMUNIZATION ADMIN: CPT | Performed by: INTERNAL MEDICINE

## 2024-03-01 PROCEDURE — 90715 TDAP VACCINE 7 YRS/> IM: CPT | Performed by: INTERNAL MEDICINE

## 2024-03-01 PROCEDURE — 3078F DIAST BP <80 MM HG: CPT | Performed by: INTERNAL MEDICINE

## 2024-03-01 PROCEDURE — 4010F ACE/ARB THERAPY RXD/TAKEN: CPT | Performed by: INTERNAL MEDICINE

## 2024-03-01 PROCEDURE — 85018 HEMOGLOBIN: CPT | Performed by: INTERNAL MEDICINE

## 2024-03-01 PROCEDURE — 1036F TOBACCO NON-USER: CPT | Performed by: INTERNAL MEDICINE

## 2024-03-01 PROCEDURE — 99214 OFFICE O/P EST MOD 30 MIN: CPT | Performed by: INTERNAL MEDICINE

## 2024-03-01 PROCEDURE — 3074F SYST BP LT 130 MM HG: CPT | Performed by: INTERNAL MEDICINE

## 2024-03-01 PROCEDURE — 99213 OFFICE O/P EST LOW 20 MIN: CPT | Performed by: INTERNAL MEDICINE

## 2024-03-01 PROCEDURE — 3008F BODY MASS INDEX DOCD: CPT | Performed by: INTERNAL MEDICINE

## 2024-03-01 ASSESSMENT — ENCOUNTER SYMPTOMS
BACK PAIN: 0
SORE THROAT: 0
SLEEP DISTURBANCE: 0
POLYPHAGIA: 0
ANAL BLEEDING: 0
JOINT SWELLING: 0
TREMORS: 0
NAUSEA: 0
SINUS PAIN: 0
DIAPHORESIS: 0
ADENOPATHY: 0
ABDOMINAL PAIN: 0
RECTAL PAIN: 0
WOUND: 0
WHEEZING: 0
FLANK PAIN: 0
BLOOD IN STOOL: 0
DIFFICULTY URINATING: 0
CHOKING: 0
VOMITING: 0
FATIGUE: 0
VOICE CHANGE: 0
MYALGIAS: 0
SPEECH DIFFICULTY: 0
FACIAL SWELLING: 0
ACTIVITY CHANGE: 0
SEIZURES: 0
PALPITATIONS: 0
LIGHT-HEADEDNESS: 0
POLYDIPSIA: 0
NECK STIFFNESS: 0
EYE DISCHARGE: 0
RHINORRHEA: 0
EYE ITCHING: 0
BRUISES/BLEEDS EASILY: 0
FREQUENCY: 0
DYSURIA: 0
FACIAL ASYMMETRY: 0
CONSTIPATION: 0
PHOTOPHOBIA: 0
COLOR CHANGE: 0
EYE PAIN: 0
COUGH: 0
APPETITE CHANGE: 0
EYE REDNESS: 0
CHEST TIGHTNESS: 0
ABDOMINAL DISTENTION: 0
SINUS PRESSURE: 0
WEAKNESS: 0
NECK PAIN: 0
ARTHRALGIAS: 0
CHILLS: 0
DIZZINESS: 0
DIARRHEA: 0
HEADACHES: 0
HEMATURIA: 0
NUMBNESS: 0
SHORTNESS OF BREATH: 0
TROUBLE SWALLOWING: 0
STRIDOR: 0

## 2024-03-01 NOTE — PROGRESS NOTES
Subjective   Patient ID: Humberto Rivas is a 48 y.o. male who presents for Follow-up.    Patient presents for follow-up.  He has been compliant with his medications, diet and exercise.  He overall feels well.  He denies any headaches, no dizziness, no chest pain or shortness of breath.  He denies abdominal pain no nausea vomiting or diarrhea.  He reports no new musculoskeletal complaints.       Review of Systems   Constitutional:  Negative for activity change, appetite change, chills, diaphoresis and fatigue.   HENT:  Negative for congestion, dental problem, drooling, ear discharge, ear pain, facial swelling, hearing loss, mouth sores, nosebleeds, postnasal drip, rhinorrhea, sinus pressure, sinus pain, sneezing, sore throat, tinnitus, trouble swallowing and voice change.    Eyes:  Negative for photophobia, pain, discharge, redness, itching and visual disturbance.   Respiratory:  Negative for cough, choking, chest tightness, shortness of breath, wheezing and stridor.    Cardiovascular:  Negative for chest pain, palpitations and leg swelling.   Gastrointestinal:  Negative for abdominal distention, abdominal pain, anal bleeding, blood in stool, constipation, diarrhea, nausea, rectal pain and vomiting.   Endocrine: Negative for cold intolerance, heat intolerance, polydipsia, polyphagia and polyuria.   Genitourinary:  Negative for decreased urine volume, difficulty urinating, dysuria, enuresis, flank pain, frequency, genital sores, hematuria and urgency.   Musculoskeletal:  Negative for arthralgias, back pain, gait problem, joint swelling, myalgias, neck pain and neck stiffness.   Skin:  Negative for color change, pallor, rash and wound.   Neurological:  Negative for dizziness, tremors, seizures, syncope, facial asymmetry, speech difficulty, weakness, light-headedness, numbness and headaches.   Hematological:  Negative for adenopathy. Does not bruise/bleed easily.   Psychiatric/Behavioral:  Negative for sleep  disturbance.        Objective   /76   Pulse 72   Wt 125 kg (275 lb)   BMI 43.07 kg/m²     Physical Exam  Constitutional:       Appearance: Normal appearance.   Cardiovascular:      Rate and Rhythm: Normal rate and regular rhythm.      Heart sounds: No murmur heard.     No gallop.   Pulmonary:      Effort: No respiratory distress.      Breath sounds: No wheezing or rales.   Abdominal:      General: There is no distension.      Palpations: There is no mass.      Tenderness: There is no abdominal tenderness. There is no guarding.   Musculoskeletal:      Right lower leg: No edema.      Left lower leg: No edema.   Neurological:      Mental Status: He is alert.       Assessment/Plan   Diagnoses and all orders for this visit:  Health care maintenance-colonoscopy has been done.  Will give a tetanus shot today.  Refuses a flu shot.  Eye and dental have been done  -     Albumin , Urine Random; Future  -     Vitamin D 25-Hydroxy,Total (for eval of Vitamin D levels); Future  -     CBC and Auto Differential; Future  -     Comprehensive Metabolic Panel; Future  -     Prostate Specific Antigen; Future  -     Uric Acid; Future  -     Urinalysis with Reflex Microscopic; Future  -     Lipid Panel; Future  -     TSH with reflex to Free T4 if abnormal; Future  Need for tetanus booster  -     Tdap vaccine, age 7 years and older  Benign essential hypertension-low-salt diet and exercise  Hypertension, unspecified type  Type 2 diabetes mellitus without complication, without long-term current use of insulin (CMS/[facility])-hemoglobin A1c was 5.9.  He will follow-up with endocrinology  Obesity (BMI 30-39.9)-diet and exercise

## 2024-03-01 NOTE — PROGRESS NOTES
Patient ID: Humberto Rivas is a 48 y.o. male who presents for Follow-up.  HPI  The patient comes in for follow up.    He is on hospitalsF for management of type 2 diabetes hypertension hyperlipidemia sleep apnea on CPAP DJD and edema.    He has had no difficulties with the program to this point.    He got off track for his birthday on Thanksgiving and has continued to struggle to get back on track.    He has been working on exercise.    Physically he has no complaints.    ROS  Comprehensive review of systems is negative.    Objective   Physical Exam  Visit Vitals  /72      Vitals:    03/01/24 1153   Weight: 130 kg (286 lb)      Body mass index is 44.79 kg/m².      Weight 286 up 7 pounds still down 34    ENT normal. No adenopathy  Fundi normal  Thyroid palpable and normal. No nodules  Chest clear to auscultation  Heart sounds are normal  Abdomen nontender. Bowel sounds normal. No organomegaly  Feet are okay  Normal vibration and monofilament sensation normal pulses, no lesions    Current Outpatient Medications   Medication Sig Dispense Refill    atorvastatin (Lipitor) 20 mg tablet Take 1 tablet (20 mg) by mouth once daily. 90 tablet 3    lisinopril 10 mg tablet Take 1 tablet (10 mg) by mouth once daily. 90 tablet 3    potassium chloride CR 10 mEq ER tablet TAKE 1 TABLET BY MOUTH TWICE DAILY 60 tablet 3     No current facility-administered medications for this visit.       Assessment/Plan     1.  Type 2 diabetes  2.  Hypertension  3.  Hyperlipidemia  4.  Sleep apnea    He is going to work to get back on track.  We discussed strategies for success. Planning ahead, taking things a week at a time and planning the week ahead of time.    We will hold on blood work today.    We discussed options if he continues to struggle such as GLP-1 receptor agonist and SGLT2 inhibitors.    He will follow-up with me in 2 months sooner as needed.

## 2024-03-27 ENCOUNTER — TELEMEDICINE CLINICAL SUPPORT (OUTPATIENT)
Dept: ENDOCRINOLOGY | Facility: CLINIC | Age: 49
End: 2024-03-27
Payer: COMMERCIAL

## 2024-03-28 NOTE — PROGRESS NOTES
Patient presents for [facility] virtual followup visit. Our February visit was cancelled so the last visit was from 2023. He has been on/off track with the diet for the last few weeks. Currently he is helping take care of his father out of state, so he is off the diet. [facility] once he is back in town. Goal weight remains to be 250lbs. He has not been exercising but does have Peloton equipment at home. Encouraged patient to stay on track and provided meal and snack ideas for variation. Encouraged exercise a few times a week, at least. Reviewed needs and diet plan. All questions answered.     Wt Readings from Last 10 Encounters:   24 130 kg (286 lb)   24 125 kg (275 lb)   23 127 kg (279 lb)   23 126 kg (277 lb)   23 127 kg (279 lb)   10/26/23 125 kg (275 lb)   10/06/23 78.9 kg (174 lb)   10/06/23 125 kg (275 lb 6.4 oz)   23 127 kg (280 lb)   23 132 kg (292 lb)     IBW: 148lb/67.3kg  Protein need calculated (1.5g/kg IBW) = 101g  Protein intake recommended: 105g = 15oz  Daily Meal Plan: Mornin oz protein Afternoon: 5.5 oz protein + 1 vegetable from the list Evenin.5 oz protein + 1 vegetable from the list  Fluids: 64-96oz per day    Recommended supplements:  Potassium as directed with breakfast and dinner (prescription) Fiber capsules 1-2 times daily or sugar-free fiber powder (as tolerated) Colace (stool softener) as needed, up to 4 capsules/day Calcium: 500-600 mg, pill form twice daily with breakfast or lunch AND dinner Multivitamin tablet or capsule per day that is low in iron (i.e. Centrum Silver) Magnesium: 200-250 mg, pill form with dinner or at bedtime Bouillon cube one to two times per day as needed, if feeling dizzy  Beginning with the fourth morning of this diet, test urine every morning using Ketostix for ketone (fat) breakdown.

## 2024-04-23 DIAGNOSIS — E11.9 TYPE 2 DIABETES MELLITUS WITHOUT COMPLICATION, WITHOUT LONG-TERM CURRENT USE OF INSULIN (MULTI): ICD-10-CM

## 2024-04-23 RX ORDER — POTASSIUM CHLORIDE 750 MG/1
10 TABLET, FILM COATED, EXTENDED RELEASE ORAL 2 TIMES DAILY
Qty: 60 TABLET | Refills: 3 | Status: SHIPPED | OUTPATIENT
Start: 2024-04-23

## 2024-04-23 NOTE — PROGRESS NOTES
Patient ID: Humberto Rivas is a 48 y.o. male who presents for No chief complaint on file..  HPI  ***    ROS  Comprehensive review of systems is negative.    Objective   Physical Exam  There were no vitals taken for this visit.   There were no vitals filed for this visit.   There is no height or weight on file to calculate BMI.      ***    Current Outpatient Medications   Medication Sig Dispense Refill    atorvastatin (Lipitor) 20 mg tablet Take 1 tablet (20 mg) by mouth once daily. 90 tablet 3    lisinopril 10 mg tablet Take 1 tablet (10 mg) by mouth once daily. 90 tablet 3    potassium chloride CR 10 mEq ER tablet TAKE 1 TABLET BY MOUTH TWICE DAILY 60 tablet 3     No current facility-administered medications for this visit.       Assessment/Plan   ***

## 2024-05-06 ENCOUNTER — TELEPHONE (OUTPATIENT)
Dept: PRIMARY CARE | Facility: CLINIC | Age: 49
End: 2024-05-06
Payer: COMMERCIAL

## 2024-05-07 NOTE — TELEPHONE ENCOUNTER
Patient needs a script to get supplies for his CPAP machine faxed over [phone]    Hose  Filter  Pillow mask with headgear

## 2024-05-10 ENCOUNTER — APPOINTMENT (OUTPATIENT)
Dept: ENDOCRINOLOGY | Facility: CLINIC | Age: 49
End: 2024-05-10
Payer: COMMERCIAL

## 2024-06-06 ENCOUNTER — LAB (OUTPATIENT)
Dept: LAB | Facility: LAB | Age: 49
End: 2024-06-06
Payer: COMMERCIAL

## 2024-06-06 DIAGNOSIS — E11.9 TYPE 2 DIABETES MELLITUS WITHOUT COMPLICATION, WITHOUT LONG-TERM CURRENT USE OF INSULIN (MULTI): Primary | ICD-10-CM

## 2024-06-06 DIAGNOSIS — E11.9 TYPE 2 DIABETES MELLITUS WITHOUT COMPLICATION, WITHOUT LONG-TERM CURRENT USE OF INSULIN (MULTI): ICD-10-CM

## 2024-06-06 DIAGNOSIS — Z00.00 HEALTH CARE MAINTENANCE: ICD-10-CM

## 2024-06-06 LAB
25(OH)D3 SERPL-MCNC: 32 NG/ML (ref 30–100)
ALBUMIN SERPL BCP-MCNC: 4.3 G/DL (ref 3.4–5)
ALP SERPL-CCNC: 78 U/L (ref 33–120)
ALT SERPL W P-5'-P-CCNC: 24 U/L (ref 10–52)
ANION GAP SERPL CALC-SCNC: 14 MMOL/L (ref 10–20)
AST SERPL W P-5'-P-CCNC: 18 U/L (ref 9–39)
BASOPHILS # BLD AUTO: 0.05 X10*3/UL (ref 0–0.1)
BASOPHILS NFR BLD AUTO: 0.8 %
BILIRUB SERPL-MCNC: 0.8 MG/DL (ref 0–1.2)
BUN SERPL-MCNC: 17 MG/DL (ref 6–23)
CALCIUM SERPL-MCNC: 9.1 MG/DL (ref 8.6–10.6)
CHLORIDE SERPL-SCNC: 106 MMOL/L (ref 98–107)
CHOLEST SERPL-MCNC: 165 MG/DL (ref 0–199)
CHOLESTEROL/HDL RATIO: 3
CO2 SERPL-SCNC: 24 MMOL/L (ref 21–32)
CREAT SERPL-MCNC: 0.84 MG/DL (ref 0.5–1.3)
CREAT UR-MCNC: 166.5 MG/DL (ref 20–370)
EGFRCR SERPLBLD CKD-EPI 2021: >90 ML/MIN/1.73M*2
EOSINOPHIL # BLD AUTO: 0.15 X10*3/UL (ref 0–0.7)
EOSINOPHIL NFR BLD AUTO: 2.3 %
ERYTHROCYTE [DISTWIDTH] IN BLOOD BY AUTOMATED COUNT: 12.5 % (ref 11.5–14.5)
EST. AVERAGE GLUCOSE BLD GHB EST-MCNC: 128 MG/DL
GLUCOSE SERPL-MCNC: 90 MG/DL (ref 74–99)
HBA1C MFR BLD: 6.1 %
HBV SURFACE AB SER-ACNC: <3.1 MIU/ML
HCT VFR BLD AUTO: 45.8 % (ref 41–52)
HCV AB SER QL: NONREACTIVE
HDLC SERPL-MCNC: 54.5 MG/DL
HGB BLD-MCNC: 16 G/DL (ref 13.5–17.5)
IMM GRANULOCYTES # BLD AUTO: 0.01 X10*3/UL (ref 0–0.7)
IMM GRANULOCYTES NFR BLD AUTO: 0.2 % (ref 0–0.9)
LDLC SERPL CALC-MCNC: 95 MG/DL
LYMPHOCYTES # BLD AUTO: 2.51 X10*3/UL (ref 1.2–4.8)
LYMPHOCYTES NFR BLD AUTO: 38 %
MCH RBC QN AUTO: 33.4 PG (ref 26–34)
MCHC RBC AUTO-ENTMCNC: 34.9 G/DL (ref 32–36)
MCV RBC AUTO: 96 FL (ref 80–100)
MICROALBUMIN UR-MCNC: <7 MG/L
MICROALBUMIN/CREAT UR: NORMAL MG/G{CREAT}
MONOCYTES # BLD AUTO: 0.7 X10*3/UL (ref 0.1–1)
MONOCYTES NFR BLD AUTO: 10.6 %
NEUTROPHILS # BLD AUTO: 3.18 X10*3/UL (ref 1.2–7.7)
NEUTROPHILS NFR BLD AUTO: 48.1 %
NON HDL CHOLESTEROL: 111 MG/DL (ref 0–149)
NRBC BLD-RTO: 0 /100 WBCS (ref 0–0)
PLATELET # BLD AUTO: 272 X10*3/UL (ref 150–450)
POTASSIUM SERPL-SCNC: 4.3 MMOL/L (ref 3.5–5.3)
PROT SERPL-MCNC: 7.2 G/DL (ref 6.4–8.2)
PSA SERPL-MCNC: 0.23 NG/ML
RBC # BLD AUTO: 4.79 X10*6/UL (ref 4.5–5.9)
SODIUM SERPL-SCNC: 140 MMOL/L (ref 136–145)
TRIGL SERPL-MCNC: 80 MG/DL (ref 0–149)
TSH SERPL-ACNC: 1.82 MIU/L (ref 0.44–3.98)
URATE SERPL-MCNC: 5.2 MG/DL (ref 4–7.5)
VLDL: 16 MG/DL (ref 0–40)
WBC # BLD AUTO: 6.6 X10*3/UL (ref 4.4–11.3)

## 2024-06-06 PROCEDURE — 86803 HEPATITIS C AB TEST: CPT

## 2024-06-06 PROCEDURE — 83036 HEMOGLOBIN GLYCOSYLATED A1C: CPT

## 2024-06-06 PROCEDURE — 82570 ASSAY OF URINE CREATININE: CPT

## 2024-06-06 PROCEDURE — 81003 URINALYSIS AUTO W/O SCOPE: CPT

## 2024-06-06 PROCEDURE — 36415 COLL VENOUS BLD VENIPUNCTURE: CPT

## 2024-06-06 PROCEDURE — 84443 ASSAY THYROID STIM HORMONE: CPT

## 2024-06-06 PROCEDURE — 82043 UR ALBUMIN QUANTITATIVE: CPT

## 2024-06-06 PROCEDURE — 84550 ASSAY OF BLOOD/URIC ACID: CPT

## 2024-06-06 PROCEDURE — 86706 HEP B SURFACE ANTIBODY: CPT

## 2024-06-06 PROCEDURE — 80053 COMPREHEN METABOLIC PANEL: CPT

## 2024-06-06 PROCEDURE — 82306 VITAMIN D 25 HYDROXY: CPT

## 2024-06-06 PROCEDURE — 80061 LIPID PANEL: CPT

## 2024-06-06 PROCEDURE — 84153 ASSAY OF PSA TOTAL: CPT

## 2024-06-06 PROCEDURE — 85025 COMPLETE CBC W/AUTO DIFF WBC: CPT

## 2024-06-07 ENCOUNTER — APPOINTMENT (OUTPATIENT)
Dept: PRIMARY CARE | Facility: CLINIC | Age: 49
End: 2024-06-07
Payer: COMMERCIAL

## 2024-06-07 LAB
APPEARANCE UR: CLEAR
BILIRUB UR STRIP.AUTO-MCNC: NEGATIVE MG/DL
COLOR UR: NORMAL
GLUCOSE UR STRIP.AUTO-MCNC: NORMAL MG/DL
KETONES UR STRIP.AUTO-MCNC: NEGATIVE MG/DL
LEUKOCYTE ESTERASE UR QL STRIP.AUTO: NEGATIVE
NITRITE UR QL STRIP.AUTO: NEGATIVE
PH UR STRIP.AUTO: 6.5 [PH]
PROT UR STRIP.AUTO-MCNC: NEGATIVE MG/DL
RBC # UR STRIP.AUTO: NEGATIVE /UL
SP GR UR STRIP.AUTO: 1.02
UROBILINOGEN UR STRIP.AUTO-MCNC: NORMAL MG/DL

## 2024-06-12 ENCOUNTER — APPOINTMENT (OUTPATIENT)
Dept: ENDOCRINOLOGY | Facility: CLINIC | Age: 49
End: 2024-06-12
Payer: COMMERCIAL

## 2024-06-14 ENCOUNTER — APPOINTMENT (OUTPATIENT)
Dept: PRIMARY CARE | Facility: CLINIC | Age: 49
End: 2024-06-14
Payer: COMMERCIAL

## 2024-06-14 ENCOUNTER — APPOINTMENT (OUTPATIENT)
Dept: ENDOCRINOLOGY | Facility: CLINIC | Age: 49
End: 2024-06-14
Payer: COMMERCIAL

## 2024-06-14 VITALS
BODY MASS INDEX: 46.99 KG/M2 | SYSTOLIC BLOOD PRESSURE: 118 MMHG | HEART RATE: 72 BPM | DIASTOLIC BLOOD PRESSURE: 76 MMHG | WEIGHT: 300 LBS

## 2024-06-14 VITALS — WEIGHT: 302 LBS | DIASTOLIC BLOOD PRESSURE: 74 MMHG | BODY MASS INDEX: 47.3 KG/M2 | SYSTOLIC BLOOD PRESSURE: 138 MMHG

## 2024-06-14 DIAGNOSIS — E78.49 OTHER HYPERLIPIDEMIA: ICD-10-CM

## 2024-06-14 DIAGNOSIS — E78.49 OTHER HYPERLIPIDEMIA: Primary | ICD-10-CM

## 2024-06-14 DIAGNOSIS — G47.33 OBSTRUCTIVE SLEEP APNEA SYNDROME: ICD-10-CM

## 2024-06-14 DIAGNOSIS — I10 HYPERTENSION, UNSPECIFIED TYPE: ICD-10-CM

## 2024-06-14 DIAGNOSIS — E11.9 TYPE 2 DIABETES MELLITUS WITHOUT COMPLICATION, WITHOUT LONG-TERM CURRENT USE OF INSULIN (MULTI): Primary | ICD-10-CM

## 2024-06-14 DIAGNOSIS — R73.03 PREDIABETES: ICD-10-CM

## 2024-06-14 PROCEDURE — 3048F LDL-C <100 MG/DL: CPT | Performed by: INTERNAL MEDICINE

## 2024-06-14 PROCEDURE — 4010F ACE/ARB THERAPY RXD/TAKEN: CPT | Performed by: INTERNAL MEDICINE

## 2024-06-14 PROCEDURE — 99213 OFFICE O/P EST LOW 20 MIN: CPT | Performed by: INTERNAL MEDICINE

## 2024-06-14 PROCEDURE — 3044F HG A1C LEVEL LT 7.0%: CPT | Performed by: INTERNAL MEDICINE

## 2024-06-14 PROCEDURE — 3062F POS MACROALBUMINURIA REV: CPT | Performed by: INTERNAL MEDICINE

## 2024-06-14 PROCEDURE — 99214 OFFICE O/P EST MOD 30 MIN: CPT | Performed by: INTERNAL MEDICINE

## 2024-06-14 PROCEDURE — 3078F DIAST BP <80 MM HG: CPT | Performed by: INTERNAL MEDICINE

## 2024-06-14 PROCEDURE — 3008F BODY MASS INDEX DOCD: CPT | Performed by: INTERNAL MEDICINE

## 2024-06-14 PROCEDURE — 3075F SYST BP GE 130 - 139MM HG: CPT | Performed by: INTERNAL MEDICINE

## 2024-06-14 PROCEDURE — 3074F SYST BP LT 130 MM HG: CPT | Performed by: INTERNAL MEDICINE

## 2024-06-14 RX ORDER — ATORVASTATIN CALCIUM 40 MG/1
40 TABLET, FILM COATED ORAL DAILY
Qty: 90 TABLET | Refills: 3 | Status: SHIPPED | OUTPATIENT
Start: 2024-06-14 | End: 2025-06-14

## 2024-06-14 ASSESSMENT — ENCOUNTER SYMPTOMS
CHEST TIGHTNESS: 0
RECTAL PAIN: 0
SINUS PRESSURE: 0
WHEEZING: 0
FACIAL ASYMMETRY: 0
ARTHRALGIAS: 0
PALPITATIONS: 0
COLOR CHANGE: 0
NUMBNESS: 0
DIAPHORESIS: 0
NECK PAIN: 0
DIZZINESS: 0
ACTIVITY CHANGE: 0
ABDOMINAL PAIN: 0
TREMORS: 0
DIARRHEA: 0
SORE THROAT: 0
COUGH: 0
CHILLS: 0
POLYDIPSIA: 0
PHOTOPHOBIA: 0
EYE ITCHING: 0
STRIDOR: 0
DIFFICULTY URINATING: 0
EYE REDNESS: 0
BLOOD IN STOOL: 0
ABDOMINAL DISTENTION: 0
ANAL BLEEDING: 0
WEAKNESS: 0
CHOKING: 0
SINUS PAIN: 0
SLEEP DISTURBANCE: 0
SHORTNESS OF BREATH: 0
WOUND: 0
SPEECH DIFFICULTY: 0
JOINT SWELLING: 0
BACK PAIN: 0
FATIGUE: 0
LIGHT-HEADEDNESS: 0
RHINORRHEA: 0
POLYPHAGIA: 0
FLANK PAIN: 0
DYSURIA: 0
EYE DISCHARGE: 0
FACIAL SWELLING: 0
APPETITE CHANGE: 0
EYE PAIN: 0
TROUBLE SWALLOWING: 0
ADENOPATHY: 0
FREQUENCY: 0
CONSTIPATION: 0
VOICE CHANGE: 0
NAUSEA: 0
NECK STIFFNESS: 0
SEIZURES: 0
VOMITING: 0
MYALGIAS: 0
BRUISES/BLEEDS EASILY: 0
HEADACHES: 0
HEMATURIA: 0

## 2024-06-14 ASSESSMENT — PAIN SCALES - GENERAL: PAINLEVEL: 0-NO PAIN

## 2024-06-14 NOTE — PROGRESS NOTES
Subjective   Patient ID: Humberto Rivas is a 48 y.o. male who presents for Follow-up (Discuss labs).    Patient presents for follow-up.  He has been compliant with his medications, diet but not exercise.  He overall feels okay.  He denies any headaches, no dizziness, no chest pain or shortness of breath.  He denies abdominal pain no nausea vomiting or diarrhea.  He reports no new musculoskeletal complaints.         Review of Systems   Constitutional:  Negative for activity change, appetite change, chills, diaphoresis and fatigue.   HENT:  Negative for congestion, dental problem, drooling, ear discharge, ear pain, facial swelling, hearing loss, mouth sores, nosebleeds, postnasal drip, rhinorrhea, sinus pressure, sinus pain, sneezing, sore throat, tinnitus, trouble swallowing and voice change.    Eyes:  Negative for photophobia, pain, discharge, redness, itching and visual disturbance.   Respiratory:  Negative for cough, choking, chest tightness, shortness of breath, wheezing and stridor.    Cardiovascular:  Negative for chest pain, palpitations and leg swelling.   Gastrointestinal:  Negative for abdominal distention, abdominal pain, anal bleeding, blood in stool, constipation, diarrhea, nausea, rectal pain and vomiting.   Endocrine: Negative for cold intolerance, heat intolerance, polydipsia, polyphagia and polyuria.   Genitourinary:  Negative for decreased urine volume, difficulty urinating, dysuria, enuresis, flank pain, frequency, genital sores, hematuria and urgency.   Musculoskeletal:  Negative for arthralgias, back pain, gait problem, joint swelling, myalgias, neck pain and neck stiffness.   Skin:  Negative for color change, pallor, rash and wound.   Neurological:  Negative for dizziness, tremors, seizures, syncope, facial asymmetry, speech difficulty, weakness, light-headedness, numbness and headaches.   Hematological:  Negative for adenopathy. Does not bruise/bleed easily.   Psychiatric/Behavioral:   Negative for sleep disturbance.        Objective   /76   Pulse 72   Wt 136 kg (300 lb)   BMI 46.99 kg/m²     Physical Exam  Constitutional:       Appearance: Normal appearance.   Cardiovascular:      Rate and Rhythm: Normal rate and regular rhythm.      Heart sounds: No murmur heard.     No gallop.   Pulmonary:      Effort: No respiratory distress.      Breath sounds: No wheezing or rales.   Abdominal:      General: There is no distension.      Palpations: There is no mass.      Tenderness: There is no abdominal tenderness. There is no guarding.   Musculoskeletal:      Right lower leg: No edema.      Left lower leg: No edema.   Neurological:      Mental Status: He is alert.         Assessment/Plan   Diagnoses and all orders for this visit:  Other hyperlipidemia-increase atorvastatin to 40 mg daily.  LDL goal less than 70  -     atorvastatin (Lipitor) 40 mg tablet; Take 1 tablet (40 mg) by mouth once daily.  Hypertension, unspecified type-stable on present medication  Prediabetes-hemoglobin A1c was 6.1.  He will diet and exercise.  Follow with endocrinology today.  Obesity-he will follow with endocrinology today.  Health maintenance-colonoscopy has been done.  Will schedule an eye and dental appointment

## 2024-06-14 NOTE — PROGRESS NOTES
Patient ID: Humberto Rivas is a 48 y.o. male who presents for Follow-up.  HPI  The patient comes in for follow up.    He was on PSMF for management of type 2 diabetes hypertension hyperlipidemia sleep apnea on CPAP DJD and edema.    He got off track for his birthday on Thanksgiving and has continued to struggle to get back on track.    He has been working on exercise.    He has had no change in his medical regimen.    Physically he has no complaints.      ROS  Comprehensive review of systems is negative.    Objective   Physical Exam  Visit Vitals  /74      Vitals:    06/14/24 1210   Weight: 137 kg (302 lb)      Body mass index is 47.3 kg/m².      Weight 302 up 16 pounds still down 18    ENT normal. No adenopathy  Fundi normal  Thyroid palpable and normal. No nodules  Chest clear to auscultation  Heart sounds are normal  Abdomen nontender. Bowel sounds normal. No organomegaly  Feet are okay  Normal vibration and monofilament sensation normal pulses, no lesions    Current Outpatient Medications   Medication Sig Dispense Refill    atorvastatin (Lipitor) 40 mg tablet Take 1 tablet (40 mg) by mouth once daily. 90 tablet 3    lisinopril 10 mg tablet Take 1 tablet (10 mg) by mouth once daily. 90 tablet 3    potassium chloride CR 10 mEq ER tablet Take 1 tablet (10 mEq) by mouth 2 times a day. 60 tablet 3     No current facility-administered medications for this visit.       Assessment/Plan     1.  Type 2 diabetes  2.  Hypertension  3.  Hyperlipidemia    He is going to work to get back on track.  We discussed strategies for success. Planning ahead, taking things a week at a time and planning the week ahead of time.    We again discussed the diagnosis of diabetes and our options such as GLP-1 receptor agonist or SGLT2 inhibitors.    He indicates that he is can use the possibility of needing those as motivation to get back on track so that he can avoid using them.    He will continue his current regimen.    Follow up  with the nutritionist in 1 month, me in 2 months, sooner as needed.

## 2024-07-08 ENCOUNTER — APPOINTMENT (OUTPATIENT)
Dept: ENDOCRINOLOGY | Facility: CLINIC | Age: 49
End: 2024-07-08
Payer: COMMERCIAL

## 2024-07-09 NOTE — PROGRESS NOTES
Patient presents for [facility] virtual followup visit. Patient has cancelled last past few previous visits due to being on/off track with the diet. Does report some life stressors, personal events that have made it challenging to adhere strictly to PSMF. Plans to fully resume PSMF on  this month. Goal weight remains to be 250lbs. He has not been exercising, encouraged walking and Peloton activity during the week. Encouraged patient to stay on track and provided meal and snack ideas for variation. Reviewed needs and diet plan. All questions answered.     Wt Readings from Last 10 Encounters:   24 137 kg (302 lb)   24 136 kg (300 lb)   24 130 kg (286 lb)   24 125 kg (275 lb)   23 127 kg (279 lb)   23 126 kg (277 lb)   23 127 kg (279 lb)   10/26/23 125 kg (275 lb)   10/06/23 78.9 kg (174 lb)   10/06/23 125 kg (275 lb 6.4 oz)     IBW: 148lb/67.3kg  Protein need calculated (1.5g/kg IBW) = 101g  Protein intake recommended: 105g = 15oz  Daily Meal Plan: Mornin oz protein Afternoon: 5.5 oz protein + 1 vegetable from the list Evenin.5 oz protein + 1 vegetable from the list  Fluids: 64-96oz per day    Recommended supplements:  Potassium as directed with breakfast and dinner (prescription) Fiber capsules 1-2 times daily or sugar-free fiber powder (as tolerated) Colace (stool softener) as needed, up to 4 capsules/day Calcium: 500-600 mg, pill form twice daily with breakfast or lunch AND dinner Multivitamin tablet or capsule per day that is low in iron (i.e. Centrum Silver) Magnesium: 200-250 mg, pill form with dinner or at bedtime Bouillon cube one to two times per day as needed, if feeling dizzy  Beginning with the fourth morning of this diet, test urine every morning using Ketostix for ketone (fat) breakdown.

## 2024-08-29 ENCOUNTER — APPOINTMENT (OUTPATIENT)
Dept: ENDOCRINOLOGY | Facility: CLINIC | Age: 49
End: 2024-08-29
Payer: COMMERCIAL

## 2024-08-29 VITALS — BODY MASS INDEX: 48.24 KG/M2 | WEIGHT: 308 LBS

## 2024-08-30 NOTE — PROGRESS NOTES
Patient presents for \[facility]\""F virtual followup visit. Patient has resumed PSMF this week and hopes to be in ketosis in a few days. He started off this month weighing in around 313lbs and has since lost 5lbs with starting the diet. Goal weight remains to be 250lbs. Taking required supplements. He has not been exercising, encouraged walking and Peloton activity during the week. Tries to walk frequently at work. Encouraged patient to stay on track. Reviewed needs and diet plan. All questions answered.     Wt Readings from Last 12 Encounters:   24 140 kg (308 lb)   24 137 kg (302 lb)   24 136 kg (300 lb)   24 130 kg (286 lb)   24 125 kg (275 lb)   23 127 kg (279 lb)   23 126 kg (277 lb)   23 127 kg (279 lb)   10/26/23 125 kg (275 lb)   10/06/23 78.9 kg (174 lb)   10/06/23 125 kg (275 lb 6.4 oz)   23 127 kg (280 lb)     IBW: 148lb/67.3kg  Protein need calculated (1.5g/kg IBW) = 101g  Protein intake recommended: 105g = 15oz  Daily Meal Plan: Mornin oz protein Afternoon: 5.5 oz protein + 1 vegetable from the list Evenin.5 oz protein + 1 vegetable from the list  Fluids: 64-96oz per day    Recommended supplements:  Potassium as directed with breakfast and dinner (prescription) Fiber capsules 1-2 times daily or sugar-free fiber powder (as tolerated) Colace (stool softener) as needed, up to 4 capsules/day Calcium: 500-600 mg, pill form twice daily with breakfast or lunch AND dinner Multivitamin tablet or capsule per day that is low in iron (i.e. Centrum Silver) Magnesium: 200-250 mg, pill form with dinner or at bedtime Bouillon cube one to two times per day as needed, if feeling dizzy  Beginning with the fourth morning of this diet, test urine every morning using Ketostix for ketone (fat) breakdown.

## 2024-09-01 DIAGNOSIS — E11.9 TYPE 2 DIABETES MELLITUS WITHOUT COMPLICATION, WITHOUT LONG-TERM CURRENT USE OF INSULIN (MULTI): ICD-10-CM

## 2024-09-01 RX ORDER — POTASSIUM CHLORIDE 750 MG/1
TABLET, EXTENDED RELEASE ORAL
Qty: 60 TABLET | Refills: 3 | Status: SHIPPED | OUTPATIENT
Start: 2024-09-01

## 2024-09-09 DIAGNOSIS — I10 PRIMARY HYPERTENSION: ICD-10-CM

## 2024-09-09 DIAGNOSIS — E11.9 TYPE 2 DIABETES MELLITUS WITHOUT COMPLICATION, WITHOUT LONG-TERM CURRENT USE OF INSULIN (MULTI): ICD-10-CM

## 2024-09-09 RX ORDER — POTASSIUM CHLORIDE 750 MG/1
10 TABLET, FILM COATED, EXTENDED RELEASE ORAL 2 TIMES DAILY
Qty: 60 TABLET | Refills: 11 | Status: SHIPPED | OUTPATIENT
Start: 2024-09-09 | End: 2024-09-09 | Stop reason: SDUPTHER

## 2024-09-09 RX ORDER — POTASSIUM CHLORIDE 750 MG/1
10 TABLET, FILM COATED, EXTENDED RELEASE ORAL 2 TIMES DAILY
Qty: 60 TABLET | Refills: 11 | Status: SHIPPED | OUTPATIENT
Start: 2024-09-09

## 2024-09-09 RX ORDER — LISINOPRIL 10 MG/1
10 TABLET ORAL DAILY
Qty: 90 TABLET | Refills: 3 | Status: SHIPPED | OUTPATIENT
Start: 2024-09-09

## 2024-09-13 ENCOUNTER — APPOINTMENT (OUTPATIENT)
Dept: PRIMARY CARE | Facility: CLINIC | Age: 49
End: 2024-09-13
Payer: COMMERCIAL

## 2024-09-27 ENCOUNTER — OFFICE VISIT (OUTPATIENT)
Dept: PRIMARY CARE | Facility: CLINIC | Age: 49
End: 2024-09-27
Payer: COMMERCIAL

## 2024-09-27 ENCOUNTER — APPOINTMENT (OUTPATIENT)
Dept: ENDOCRINOLOGY | Facility: CLINIC | Age: 49
End: 2024-09-27
Payer: COMMERCIAL

## 2024-09-27 VITALS
SYSTOLIC BLOOD PRESSURE: 104 MMHG | DIASTOLIC BLOOD PRESSURE: 68 MMHG | WEIGHT: 295 LBS | BODY MASS INDEX: 46.2 KG/M2 | HEART RATE: 80 BPM

## 2024-09-27 VITALS — WEIGHT: 297 LBS | BODY MASS INDEX: 46.52 KG/M2 | SYSTOLIC BLOOD PRESSURE: 112 MMHG | DIASTOLIC BLOOD PRESSURE: 70 MMHG

## 2024-09-27 DIAGNOSIS — I10 BENIGN ESSENTIAL HYPERTENSION: ICD-10-CM

## 2024-09-27 DIAGNOSIS — E78.49 OTHER HYPERLIPIDEMIA: ICD-10-CM

## 2024-09-27 DIAGNOSIS — G47.33 OBSTRUCTIVE SLEEP APNEA SYNDROME: ICD-10-CM

## 2024-09-27 DIAGNOSIS — I10 HYPERTENSION, UNSPECIFIED TYPE: ICD-10-CM

## 2024-09-27 DIAGNOSIS — E66.01 CLASS 3 SEVERE OBESITY DUE TO EXCESS CALORIES WITH SERIOUS COMORBIDITY AND BODY MASS INDEX (BMI) OF 45.0 TO 49.9 IN ADULT: ICD-10-CM

## 2024-09-27 DIAGNOSIS — E11.9 TYPE 2 DIABETES MELLITUS WITHOUT COMPLICATION, WITHOUT LONG-TERM CURRENT USE OF INSULIN (MULTI): Primary | ICD-10-CM

## 2024-09-27 DIAGNOSIS — E11.9 TYPE 2 DIABETES MELLITUS WITHOUT COMPLICATION, WITHOUT LONG-TERM CURRENT USE OF INSULIN (MULTI): ICD-10-CM

## 2024-09-27 DIAGNOSIS — Z23 NEED FOR HEPATITIS B BOOSTER VACCINATION: Primary | ICD-10-CM

## 2024-09-27 LAB
POC FINGERSTICK BLOOD GLUCOSE: 98 MG/DL (ref 70–100)
POC HEMOGLOBIN A1C: 6.3 % (ref 4.2–6.5)

## 2024-09-27 PROCEDURE — 3078F DIAST BP <80 MM HG: CPT | Performed by: INTERNAL MEDICINE

## 2024-09-27 PROCEDURE — 90739 HEPB VACC 2/4 DOSE ADULT IM: CPT | Performed by: INTERNAL MEDICINE

## 2024-09-27 PROCEDURE — 90471 IMMUNIZATION ADMIN: CPT | Performed by: INTERNAL MEDICINE

## 2024-09-27 PROCEDURE — 3074F SYST BP LT 130 MM HG: CPT | Performed by: INTERNAL MEDICINE

## 2024-09-27 PROCEDURE — 82962 GLUCOSE BLOOD TEST: CPT | Performed by: INTERNAL MEDICINE

## 2024-09-27 PROCEDURE — 3048F LDL-C <100 MG/DL: CPT | Performed by: INTERNAL MEDICINE

## 2024-09-27 PROCEDURE — 3062F POS MACROALBUMINURIA REV: CPT | Performed by: INTERNAL MEDICINE

## 2024-09-27 PROCEDURE — 1036F TOBACCO NON-USER: CPT | Performed by: INTERNAL MEDICINE

## 2024-09-27 PROCEDURE — 83036 HEMOGLOBIN GLYCOSYLATED A1C: CPT | Performed by: INTERNAL MEDICINE

## 2024-09-27 PROCEDURE — 99213 OFFICE O/P EST LOW 20 MIN: CPT | Performed by: INTERNAL MEDICINE

## 2024-09-27 PROCEDURE — 3044F HG A1C LEVEL LT 7.0%: CPT | Performed by: INTERNAL MEDICINE

## 2024-09-27 PROCEDURE — 99214 OFFICE O/P EST MOD 30 MIN: CPT | Performed by: INTERNAL MEDICINE

## 2024-09-27 PROCEDURE — 4010F ACE/ARB THERAPY RXD/TAKEN: CPT | Performed by: INTERNAL MEDICINE

## 2024-09-27 ASSESSMENT — ENCOUNTER SYMPTOMS
NECK STIFFNESS: 0
TROUBLE SWALLOWING: 0
LIGHT-HEADEDNESS: 0
STRIDOR: 0
PHOTOPHOBIA: 0
BACK PAIN: 0
NAUSEA: 0
HEADACHES: 0
FACIAL ASYMMETRY: 0
NUMBNESS: 0
ACTIVITY CHANGE: 0
SHORTNESS OF BREATH: 0
WEAKNESS: 0
CONSTIPATION: 0
JOINT SWELLING: 0
CHOKING: 0
SINUS PAIN: 0
POLYPHAGIA: 0
VOICE CHANGE: 0
DIAPHORESIS: 0
SEIZURES: 0
RHINORRHEA: 0
APPETITE CHANGE: 0
COUGH: 0
FATIGUE: 0
ARTHRALGIAS: 0
VOMITING: 0
WOUND: 0
WHEEZING: 0
DYSURIA: 0
DIZZINESS: 0
MYALGIAS: 0
COLOR CHANGE: 0
RECTAL PAIN: 0
EYE PAIN: 0
PALPITATIONS: 0
SINUS PRESSURE: 0
BRUISES/BLEEDS EASILY: 0
FACIAL SWELLING: 0
ABDOMINAL DISTENTION: 0
SORE THROAT: 0
ADENOPATHY: 0
DIARRHEA: 0
EYE DISCHARGE: 0
ABDOMINAL PAIN: 0
DIFFICULTY URINATING: 0
SLEEP DISTURBANCE: 0
TREMORS: 0
FREQUENCY: 0
HEMATURIA: 0
CHEST TIGHTNESS: 0
NECK PAIN: 0
BLOOD IN STOOL: 0
EYE ITCHING: 0
ANAL BLEEDING: 0
CHILLS: 0
EYE REDNESS: 0
FLANK PAIN: 0
SPEECH DIFFICULTY: 0
POLYDIPSIA: 0

## 2024-09-27 ASSESSMENT — PAIN SCALES - GENERAL: PAINLEVEL: 0-NO PAIN

## 2024-09-27 NOTE — PROGRESS NOTES
Patient ID: Humberto Rivas is a 48 y.o. male who presents for Follow-up.  HPI  The patient comes in for follow up.    He was on PSMF for management of type 2 diabetes hypertension hyperlipidemia sleep apnea on CPAP DJD and edema.    He got off track for his birthday on Thanksgiving and has continued to struggle to get back on track.    He did tighten things up back in August.    He has been working on exercise.    He has had no change in his medical regimen.    Physically he has no complaints.      ROS  Comprehensive review of systems is negative.    Objective   Physical Exam  Visit Vitals  /70      Vitals:    09/27/24 1218   Weight: 135 kg (297 lb)      Body mass index is 46.52 kg/m².      Weight 297 down 5 pounds for total of 23    ENT normal. No adenopathy  Fundi normal  Thyroid palpable and normal. No nodules  Chest clear to auscultation  Heart sounds are normal  Abdomen nontender. Bowel sounds normal. No organomegaly  Feet are okay  Normal vibration and monofilament sensation normal pulses, no lesions    Current Outpatient Medications   Medication Sig Dispense Refill    atorvastatin (Lipitor) 40 mg tablet Take 1 tablet (40 mg) by mouth once daily. 90 tablet 3    lisinopril 10 mg tablet TAKE 1 TABLET(10 MG) BY MOUTH EVERY DAY 90 tablet 3    potassium chloride CR 10 mEq ER tablet Take 1 tablet (10 mEq) by mouth 2 times a day. Do not crush, chew, or split. 60 tablet 11     No current facility-administered medications for this visit.       Assessment/Plan     1.  Type 2 diabetes  2.  Hypertension  3.  Hyperlipidemia  4.  Sleep apnea    We reviewed his most recent blood work.    He will continue his current regimen.    He is going to work to get back and stay on track.  We discussed strategies for success. Planning ahead, taking things a week at a time and planning the week ahead of time.    We again discussed options such as GLP-1 receptor agonist or SGLT2 inhibitors.    We discussed the upcoming  holidays.    Follow up with the nutritionist in 1 month, me in 2 months, sooner as needed.

## 2024-09-27 NOTE — PROGRESS NOTES
Subjective   Patient ID: Humberto Rivas is a 48 y.o. male who presents for Follow-up.    Patient presents for follow-up.  He has been compliant with his medications, diet and exercise.  He is purposely losing weight.  He overall feels well.  He denies any headaches, no dizziness, no chest pain or shortness of breath.  He denies abdominal pain no nausea vomiting or diarrhea.  He reports no new musculoskeletal complaints.         Review of Systems   Constitutional:  Negative for activity change, appetite change, chills, diaphoresis and fatigue.   HENT:  Negative for congestion, dental problem, drooling, ear discharge, ear pain, facial swelling, hearing loss, mouth sores, nosebleeds, postnasal drip, rhinorrhea, sinus pressure, sinus pain, sneezing, sore throat, tinnitus, trouble swallowing and voice change.    Eyes:  Negative for photophobia, pain, discharge, redness, itching and visual disturbance.   Respiratory:  Negative for cough, choking, chest tightness, shortness of breath, wheezing and stridor.    Cardiovascular:  Negative for chest pain, palpitations and leg swelling.   Gastrointestinal:  Negative for abdominal distention, abdominal pain, anal bleeding, blood in stool, constipation, diarrhea, nausea, rectal pain and vomiting.   Endocrine: Negative for cold intolerance, heat intolerance, polydipsia, polyphagia and polyuria.   Genitourinary:  Negative for decreased urine volume, difficulty urinating, dysuria, enuresis, flank pain, frequency, genital sores, hematuria and urgency.   Musculoskeletal:  Negative for arthralgias, back pain, gait problem, joint swelling, myalgias, neck pain and neck stiffness.   Skin:  Negative for color change, pallor, rash and wound.   Neurological:  Negative for dizziness, tremors, seizures, syncope, facial asymmetry, speech difficulty, weakness, light-headedness, numbness and headaches.   Hematological:  Negative for adenopathy. Does not bruise/bleed easily.    Psychiatric/Behavioral:  Negative for sleep disturbance.        Objective   /68   Pulse 80   Wt 134 kg (295 lb)   BMI 46.20 kg/m²     Physical Exam  Constitutional:       Appearance: Normal appearance.   Cardiovascular:      Rate and Rhythm: Normal rate and regular rhythm.      Heart sounds: No murmur heard.     No gallop.   Pulmonary:      Effort: No respiratory distress.      Breath sounds: No wheezing or rales.   Abdominal:      General: There is no distension.      Palpations: There is no mass.      Tenderness: There is no abdominal tenderness. There is no guarding.   Musculoskeletal:      Right lower leg: No edema.      Left lower leg: No edema.   Neurological:      Mental Status: He is alert.         Assessment/Plan   Diagnoses and all orders for this visit:  Need for hepatitis B booster vaccination  Type 2 diabetes mellitus without complication, without long-term current use of insulin (Multi)-hemoglobin A1c was 6.3.  Will continue her present management.  Diet and exercise  -     POCT Fingerstick Glucose manually resulted  -     POCT glycosylated hemoglobin (Hb A1C) manually resulted  Benign essential hypertension-stable on present medication  Hypertension, unspecified type  Other hyperlipidemia-we will continue with statin  Class 3 severe obesity due to excess calories with serious comorbidity and body mass index (BMI) of 45.0 to 49.9 in adult (Multi)-patient congratulated on his weight loss.  Health maintenance-refused a flu shot.  Will give the hepatitis B vaccine.  Colonoscopy has been done.  Eye and dental appointments have been done

## 2024-09-27 NOTE — PATIENT INSTRUCTIONS
Please take medication as prescribed.  Follow-up in 1 month for second hepatitis B vaccine.  Follow-up in 3 months with me.

## 2024-10-23 ENCOUNTER — APPOINTMENT (OUTPATIENT)
Dept: ENDOCRINOLOGY | Facility: CLINIC | Age: 49
End: 2024-10-23
Payer: COMMERCIAL

## 2024-10-24 NOTE — PROGRESS NOTES
[facility] Patient has resumed PSMF late August however reports not reaching ketosis. Reviewed diet recall specifically condiment, fat and beverage intake. Patient will tighten up condiment intake (sauces, ketchup, food cooked in butter). Goal weight remains to be 250lbs. Taking required supplements. He has not been exercising, encouraged walking and Peloton activity during the week. Tries to walk frequently at work. Encouraged patient to stay on track. Reviewed needs and diet plan. All questions answered.     Wt Readings from Last 12 Encounters:   24 135 kg (297 lb)   24 134 kg (295 lb)   24 140 kg (308 lb)   24 137 kg (302 lb)   24 136 kg (300 lb)   24 130 kg (286 lb)   24 125 kg (275 lb)   23 127 kg (279 lb)   23 126 kg (277 lb)   23 127 kg (279 lb)   10/26/23 125 kg (275 lb)   10/06/23 78.9 kg (174 lb)     IBW: 148lb/67.3kg  Protein need calculated (1.5g/kg IBW) = 101g  Protein intake recommended: 105g = 15oz  Daily Meal Plan: Mornin oz protein Afternoon: 5.5 oz protein + 1 vegetable from the list Evenin.5 oz protein + 1 vegetable from the list  Fluids: 64-96oz per day    Recommended supplements:  Potassium as directed with breakfast and dinner (prescription) Fiber capsules 1-2 times daily or sugar-free fiber powder (as tolerated) Colace (stool softener) as needed, up to 4 capsules/day Calcium: 500-600 mg, pill form twice daily with breakfast or lunch AND dinner Multivitamin tablet or capsule per day that is low in iron (i.e. Centrum Silver) Magnesium: 200-250 mg, pill form with dinner or at bedtime Bouillon cube one to two times per day as needed, if feeling dizzy  Beginning with the fourth morning of this diet, test urine every morning using Ketostix for ketone (fat) breakdown.

## 2024-11-01 ENCOUNTER — APPOINTMENT (OUTPATIENT)
Dept: PRIMARY CARE | Facility: CLINIC | Age: 49
End: 2024-11-01
Payer: COMMERCIAL

## 2024-11-01 DIAGNOSIS — Z23 IMMUNIZATION DUE: ICD-10-CM

## 2024-11-01 PROCEDURE — 90471 IMMUNIZATION ADMIN: CPT | Performed by: INTERNAL MEDICINE

## 2024-11-01 PROCEDURE — 90739 HEPB VACC 2/4 DOSE ADULT IM: CPT | Performed by: INTERNAL MEDICINE

## 2025-01-03 ENCOUNTER — APPOINTMENT (OUTPATIENT)
Dept: PRIMARY CARE | Facility: CLINIC | Age: 50
End: 2025-01-03
Payer: COMMERCIAL

## 2025-01-31 ENCOUNTER — APPOINTMENT (OUTPATIENT)
Dept: ENDOCRINOLOGY | Facility: CLINIC | Age: 50
End: 2025-01-31
Payer: COMMERCIAL

## 2025-01-31 ENCOUNTER — OFFICE VISIT (OUTPATIENT)
Dept: PRIMARY CARE | Facility: CLINIC | Age: 50
End: 2025-01-31
Payer: COMMERCIAL

## 2025-01-31 VITALS — DIASTOLIC BLOOD PRESSURE: 78 MMHG | SYSTOLIC BLOOD PRESSURE: 134 MMHG | BODY MASS INDEX: 48.71 KG/M2 | WEIGHT: 311 LBS

## 2025-01-31 VITALS
SYSTOLIC BLOOD PRESSURE: 110 MMHG | BODY MASS INDEX: 48.08 KG/M2 | HEART RATE: 72 BPM | DIASTOLIC BLOOD PRESSURE: 76 MMHG | WEIGHT: 307 LBS

## 2025-01-31 DIAGNOSIS — G47.33 OBSTRUCTIVE SLEEP APNEA SYNDROME: ICD-10-CM

## 2025-01-31 DIAGNOSIS — E11.9 TYPE 2 DIABETES MELLITUS WITHOUT COMPLICATION, WITHOUT LONG-TERM CURRENT USE OF INSULIN (MULTI): Primary | ICD-10-CM

## 2025-01-31 DIAGNOSIS — E78.49 OTHER HYPERLIPIDEMIA: ICD-10-CM

## 2025-01-31 DIAGNOSIS — E66.9 OBESITY (BMI 30-39.9): ICD-10-CM

## 2025-01-31 DIAGNOSIS — E11.9 TYPE 2 DIABETES MELLITUS WITHOUT COMPLICATION, WITHOUT LONG-TERM CURRENT USE OF INSULIN (MULTI): ICD-10-CM

## 2025-01-31 DIAGNOSIS — I10 HYPERTENSION, UNSPECIFIED TYPE: ICD-10-CM

## 2025-01-31 DIAGNOSIS — I10 BENIGN ESSENTIAL HYPERTENSION: Primary | ICD-10-CM

## 2025-01-31 LAB — POC HEMOGLOBIN A1C: 6.3 % (ref 4.2–6.5)

## 2025-01-31 PROCEDURE — 99214 OFFICE O/P EST MOD 30 MIN: CPT | Performed by: INTERNAL MEDICINE

## 2025-01-31 PROCEDURE — 1036F TOBACCO NON-USER: CPT | Performed by: INTERNAL MEDICINE

## 2025-01-31 PROCEDURE — 3078F DIAST BP <80 MM HG: CPT | Performed by: INTERNAL MEDICINE

## 2025-01-31 PROCEDURE — 99213 OFFICE O/P EST LOW 20 MIN: CPT | Performed by: INTERNAL MEDICINE

## 2025-01-31 PROCEDURE — 83036 HEMOGLOBIN GLYCOSYLATED A1C: CPT | Performed by: INTERNAL MEDICINE

## 2025-01-31 PROCEDURE — 4010F ACE/ARB THERAPY RXD/TAKEN: CPT | Performed by: INTERNAL MEDICINE

## 2025-01-31 PROCEDURE — 3074F SYST BP LT 130 MM HG: CPT | Performed by: INTERNAL MEDICINE

## 2025-01-31 PROCEDURE — 3075F SYST BP GE 130 - 139MM HG: CPT | Performed by: INTERNAL MEDICINE

## 2025-01-31 ASSESSMENT — ENCOUNTER SYMPTOMS
COUGH: 0
CHEST TIGHTNESS: 0
PHOTOPHOBIA: 0
FACIAL SWELLING: 0
STRIDOR: 0
PALPITATIONS: 0
RHINORRHEA: 0
SPEECH DIFFICULTY: 0
SINUS PRESSURE: 0
BACK PAIN: 0
FACIAL ASYMMETRY: 0
ARTHRALGIAS: 0
DIARRHEA: 0
TREMORS: 0
FLANK PAIN: 0
LIGHT-HEADEDNESS: 0
ABDOMINAL DISTENTION: 0
CHOKING: 0
POLYDIPSIA: 0
WHEEZING: 0
ADENOPATHY: 0
ACTIVITY CHANGE: 0
COLOR CHANGE: 0
HEMATURIA: 0
EYE DISCHARGE: 0
DIFFICULTY URINATING: 0
HEADACHES: 0
NAUSEA: 0
SORE THROAT: 0
DIZZINESS: 0
DYSURIA: 0
EYE PAIN: 0
BRUISES/BLEEDS EASILY: 0
WOUND: 0
NECK STIFFNESS: 0
VOMITING: 0
NUMBNESS: 0
SHORTNESS OF BREATH: 0
EYE REDNESS: 0
APPETITE CHANGE: 0
TROUBLE SWALLOWING: 0
VOICE CHANGE: 0
ANAL BLEEDING: 0
FATIGUE: 0
DIAPHORESIS: 0
FREQUENCY: 0
MYALGIAS: 0
SLEEP DISTURBANCE: 0
SINUS PAIN: 0
WEAKNESS: 0
EYE ITCHING: 0
SEIZURES: 0
POLYPHAGIA: 0
CHILLS: 0
CONSTIPATION: 0
NECK PAIN: 0
JOINT SWELLING: 0
BLOOD IN STOOL: 0
ABDOMINAL PAIN: 0
RECTAL PAIN: 0

## 2025-01-31 ASSESSMENT — PAIN SCALES - GENERAL: PAINLEVEL_OUTOF10: 0-NO PAIN

## 2025-01-31 NOTE — PROGRESS NOTES
Patient ID: Humberto Rivas is a 49 y.o. male who presents for Follow-up.  HPI  The patient comes in for follow up.    He was on PSMF for management of type 2 diabetes hypertension hyperlipidemia sleep apnea on CPAP DJD and edema.    He got off track f over a year ago and has been struggling to get back on track.    He states he is on track for 2 to 3 days and then gets off track.    He has been working on exercise.    He has had no change in his medical regimen.    Physically he has no complaints.      ROS  Comprehensive review of systems is negative.    Objective   Physical Exam  Visit Vitals  /78      Vitals:    01/31/25 1005   Weight: 141 kg (311 lb)      Body mass index is 48.71 kg/m².      Weight 311 up 14 pounds still down 9    ENT normal. No adenopathy  Fundi normal  Thyroid palpable and normal. No nodules  Chest clear to auscultation  Heart sounds are normal  Abdomen nontender. Bowel sounds normal. No organomegaly  Feet are okay  Normal vibration and monofilament sensation normal pulses, no lesions    Current Outpatient Medications   Medication Sig Dispense Refill    atorvastatin (Lipitor) 40 mg tablet Take 1 tablet (40 mg) by mouth once daily. 90 tablet 3    lisinopril 10 mg tablet TAKE 1 TABLET(10 MG) BY MOUTH EVERY DAY 90 tablet 3    potassium chloride CR 10 mEq ER tablet Take 1 tablet (10 mEq) by mouth 2 times a day. Do not crush, chew, or split. 60 tablet 11     No current facility-administered medications for this visit.       Assessment/Plan     1.  Type 2 diabetes  2.  Hypertension  3.  Hyperlipidemia  4.  Sleep apnea    Will check hemoglobin A1c by fingerstick today.    If his hemoglobin A1c is significantly higher we will add in Mounjaro.    He is going to work to tighten up and get back on track.  We discussed strategies for success. Planning ahead, taking things a week at a time and planning the week ahead of time.    We discussed that if he is unable to follow the program at this point  would look at Mounjaro at the next appointment.    He will follow-up with me in 2 months sooner as needed.

## 2025-01-31 NOTE — PROGRESS NOTES
Subjective   Patient ID: Humberto Rivas is a 49 y.o. male who presents for Follow-up.    Patient presents for follow-up.  He has been compliant with his medications, but not diet or exercise.  He overall feels okay.  Reports occasional headaches, no dizziness, no sinus problems, no chest pain or shortness of breath.  He denies abdominal pain no nausea vomiting or diarrhea.  Reports no new musculoskeletal complaints.         Review of Systems   Constitutional:  Negative for activity change, appetite change, chills, diaphoresis and fatigue.   HENT:  Negative for congestion, dental problem, drooling, ear discharge, ear pain, facial swelling, hearing loss, mouth sores, nosebleeds, postnasal drip, rhinorrhea, sinus pressure, sinus pain, sneezing, sore throat, tinnitus, trouble swallowing and voice change.    Eyes:  Negative for photophobia, pain, discharge, redness, itching and visual disturbance.   Respiratory:  Negative for cough, choking, chest tightness, shortness of breath, wheezing and stridor.    Cardiovascular:  Negative for chest pain, palpitations and leg swelling.   Gastrointestinal:  Negative for abdominal distention, abdominal pain, anal bleeding, blood in stool, constipation, diarrhea, nausea, rectal pain and vomiting.   Endocrine: Negative for cold intolerance, heat intolerance, polydipsia, polyphagia and polyuria.   Genitourinary:  Negative for decreased urine volume, difficulty urinating, dysuria, enuresis, flank pain, frequency, genital sores, hematuria and urgency.   Musculoskeletal:  Negative for arthralgias, back pain, gait problem, joint swelling, myalgias, neck pain and neck stiffness.   Skin:  Negative for color change, pallor, rash and wound.   Neurological:  Negative for dizziness, tremors, seizures, syncope, facial asymmetry, speech difficulty, weakness, light-headedness, numbness and headaches.   Hematological:  Negative for adenopathy. Does not bruise/bleed easily.    Psychiatric/Behavioral:  Negative for sleep disturbance.        Objective   Wt 139 kg (307 lb)   BMI 48.08 kg/m²     Physical Exam  Constitutional:       Appearance: Normal appearance.   Cardiovascular:      Rate and Rhythm: Normal rate and regular rhythm.      Heart sounds: No murmur heard.     No gallop.   Pulmonary:      Effort: No respiratory distress.      Breath sounds: No wheezing or rales.   Abdominal:      General: There is no distension.      Palpations: There is no mass.      Tenderness: There is no abdominal tenderness. There is no guarding.   Musculoskeletal:      Right lower leg: No edema.      Left lower leg: No edema.   Neurological:      Mental Status: He is alert.         Assessment/Plan   Diagnoses and all orders for this visit:  Benign essential hypertension  Type 2 diabetes mellitus without complication, without long-term current use of insulin (Multi)-hemoglobin A1c was 6.3.  Follow-up with endocrinology.  Diet and exercise.  Will consider Mounjaro  Other hyperlipidemia-we will continue with statin  Obesity (BMI 30-39.9)-he is encouraged to diet and exercise  Obstructive sleep apnea syndrome-he will use his CPAP nightly.  Health maintenance-refuses immunizations.  Colonoscopy has been done.  He will schedule eye and dental appointment

## 2025-02-18 ENCOUNTER — TELEPHONE (OUTPATIENT)
Dept: ENDOCRINOLOGY | Facility: CLINIC | Age: 50
End: 2025-02-18
Payer: COMMERCIAL

## 2025-02-18 NOTE — PROGRESS NOTES
Reached out to patient a a few times in the last few months to schedule [facility] virtual followup visit. Voicemails were left. At this time, per policy, pt will be closed out of program. Patient has this RD's contact info for future appts.

## 2025-05-02 ENCOUNTER — APPOINTMENT (OUTPATIENT)
Dept: PRIMARY CARE | Facility: CLINIC | Age: 50
End: 2025-05-02
Payer: COMMERCIAL

## 2025-06-20 ENCOUNTER — APPOINTMENT (OUTPATIENT)
Dept: ENDOCRINOLOGY | Facility: CLINIC | Age: 50
End: 2025-06-20
Payer: COMMERCIAL

## 2025-06-20 ENCOUNTER — OFFICE VISIT (OUTPATIENT)
Dept: PRIMARY CARE | Facility: CLINIC | Age: 50
End: 2025-06-20
Payer: COMMERCIAL

## 2025-06-20 VITALS — SYSTOLIC BLOOD PRESSURE: 138 MMHG | BODY MASS INDEX: 48.71 KG/M2 | WEIGHT: 311 LBS | DIASTOLIC BLOOD PRESSURE: 74 MMHG

## 2025-06-20 VITALS
BODY MASS INDEX: 48.81 KG/M2 | HEIGHT: 67 IN | WEIGHT: 311 LBS | SYSTOLIC BLOOD PRESSURE: 108 MMHG | HEART RATE: 68 BPM | DIASTOLIC BLOOD PRESSURE: 78 MMHG

## 2025-06-20 DIAGNOSIS — E11.9 TYPE 2 DIABETES MELLITUS WITHOUT COMPLICATION, WITHOUT LONG-TERM CURRENT USE OF INSULIN: Primary | ICD-10-CM

## 2025-06-20 DIAGNOSIS — E11.9 TYPE 2 DIABETES MELLITUS WITHOUT COMPLICATION, WITHOUT LONG-TERM CURRENT USE OF INSULIN: ICD-10-CM

## 2025-06-20 DIAGNOSIS — G47.33 OBSTRUCTIVE SLEEP APNEA SYNDROME: ICD-10-CM

## 2025-06-20 DIAGNOSIS — Z00.00 HEALTH CARE MAINTENANCE: Primary | ICD-10-CM

## 2025-06-20 DIAGNOSIS — I10 BENIGN ESSENTIAL HYPERTENSION: ICD-10-CM

## 2025-06-20 DIAGNOSIS — E78.49 OTHER HYPERLIPIDEMIA: ICD-10-CM

## 2025-06-20 DIAGNOSIS — I10 HYPERTENSION, UNSPECIFIED TYPE: ICD-10-CM

## 2025-06-20 PROCEDURE — 3078F DIAST BP <80 MM HG: CPT | Performed by: INTERNAL MEDICINE

## 2025-06-20 PROCEDURE — 3075F SYST BP GE 130 - 139MM HG: CPT | Performed by: INTERNAL MEDICINE

## 2025-06-20 PROCEDURE — 4010F ACE/ARB THERAPY RXD/TAKEN: CPT | Performed by: INTERNAL MEDICINE

## 2025-06-20 PROCEDURE — 3008F BODY MASS INDEX DOCD: CPT | Performed by: INTERNAL MEDICINE

## 2025-06-20 PROCEDURE — 1036F TOBACCO NON-USER: CPT | Performed by: INTERNAL MEDICINE

## 2025-06-20 PROCEDURE — 3074F SYST BP LT 130 MM HG: CPT | Performed by: INTERNAL MEDICINE

## 2025-06-20 PROCEDURE — 3044F HG A1C LEVEL LT 7.0%: CPT | Performed by: INTERNAL MEDICINE

## 2025-06-20 PROCEDURE — 99214 OFFICE O/P EST MOD 30 MIN: CPT | Performed by: INTERNAL MEDICINE

## 2025-06-20 ASSESSMENT — PATIENT HEALTH QUESTIONNAIRE - PHQ9
1. LITTLE INTEREST OR PLEASURE IN DOING THINGS: NOT AT ALL
SUM OF ALL RESPONSES TO PHQ9 QUESTIONS 1 AND 2: 0
2. FEELING DOWN, DEPRESSED OR HOPELESS: NOT AT ALL

## 2025-06-20 ASSESSMENT — PAIN SCALES - GENERAL: PAINLEVEL_OUTOF10: 0-NO PAIN

## 2025-06-20 NOTE — PROGRESS NOTES
"Subjective   Patient ID: Humberto Rivas is a 49 y.o. male who presents for Follow-up (indigestion).  History of Present Illness  The patient presents for evaluation of prediabetes, hypertension, higher body weight, hyperlipidemia, health maintenance, sleep apnea, GERD, and elevated cardiac score.    He reports no symptoms of depression. He has not received the influenza or shingles vaccines. Recent consultations with an ophthalmologist and a dentist have been completed.    He is currently on atorvastatin.    He has been experiencing occasional indigestion over the past few days, accompanied by frequent belching, particularly after consuming ginger ale. He does not report any gastrointestinal symptoms such as abdominal pain, nausea, vomiting, diarrhea, or constipation.    He has been compliant with his CPAP therapy at night, which he reports as effective.    He also reports nocturia. His weight has been fluctuating, with a recorded weight of 311 today, down from 318 previously. He acknowledges a decrease in his weight last week. He reports a lack of regular exercise but is attempting to incorporate more physical activity into his routine, including the use of an exercise bike. He has declined the use of Mounjaro or Ozempic for weight management.      PMHx, FHx, Social Hx, Surg Hx personally reviewed at this appointment. No pertinent findings and/or changes from prior (if applicable).    ROS: Unless specified above, pt denies wt gain/loss f/c HA LoC CP SOB NVDC. See HPI above, and scanned sheet (if applicable). All other systems are reviewed and are without complaint.     Objective     /78   Pulse 68   Ht 1.702 m (5' 7\")   Wt 141 kg (311 lb)   BMI 48.71 kg/m²      Physical Exam  General Appearance: Sitting in bed in no acute distres  Pulmonary: Clear to auscultation, no wheezing, rales or rhonchi.  Cardiovascular: Regular rate and rhythm, no murmurs, rubs, or gallops.  Abdominal: Soft, no tenderness, no " distention, no masses.  Extremities: No edema, no cyanosis.      Lab Results   Component Value Date    WBC 6.6 06/06/2024    HGB 16.0 06/06/2024    HCT 45.8 06/06/2024     06/06/2024    CHOL 165 06/06/2024    TRIG 80 06/06/2024    HDL 54.5 06/06/2024    ALT 24 06/06/2024    AST 18 06/06/2024     06/06/2024    K 4.3 06/06/2024     06/06/2024    CREATININE 0.84 06/06/2024    BUN 17 06/06/2024    CO2 24 06/06/2024    TSH 1.82 06/06/2024    PSA 0.23 06/06/2024    HGBA1C 6.3 01/31/2025     par     Current Outpatient Medications   Medication Instructions    atorvastatin (LIPITOR) 40 mg, oral, Daily    lisinopril 10 mg, oral, Daily    potassium chloride CR 10 mEq ER tablet 10 mEq, oral, 2 times daily, Do not crush, chew, or split.        ECG 12 lead  Nsr. No acute changes           Assessment & Plan  1. Prediabetes.  - Laboratory tests will be conducted to monitor glucose levels and hemoglobin A1c.  - No recent blood work has been done.  - Discussion about the need for A1c testing.  - Blood and urine tests ordered.    2. Hypertension.  - The condition is currently stable with the existing medication regimen.  - No recent symptoms of headaches, dizziness, or chest pain.  - Review of current medications, including atorvastatin.  - Lisinopril prescription discussed.    3. Obesity.  - Weight has increased from 307 lbs in 01/2025 to 318 lbs today.  - No formal exercise routine currently in place.  - Encouraged to engage in 30 minutes of exercise 5 days a week.  - Follow-up appointment with endocrinology scheduled.    4. Hyperlipidemia.  - A fasting lipid profile will be obtained for further evaluation.  - Current medication includes atorvastatin.  - No recent symptoms of stomach pain, nausea, vomiting, diarrhea, or constipation.  - Blood work ordered.    5. Health maintenance.  - Colonoscopy has been done.  - Refuses immunizations, including flu and shingles vaccines.  - Eye and dental appointments have been  completed.  - Full physical examination scheduled in 6 months.    6. Sleep apnea.  - He will continue to use his CPAP machine nightly.  - No recent symptoms of headaches or dizziness.  - CPAP therapy is effective.    7. Gastroesophageal reflux disease.  - He will use Prilosec over the counter.  - Recent symptoms include indigestion and belching.  - Advised to take Prilosec and call if symptoms do not improve.    8. Elevated cardiac score.  - Risk factors will be modified accordingly.  - No recent symptoms of chest pain or shortness of breath.  - Encouraged to engage in regular exercise.    Follow-up  - The patient will follow up in 6 months for a full physical examination.          Hi Torres MD       This medical note was created with the assistance of artificial intelligence (AI) for documentation purposes. The content has been reviewed and confirmed by the healthcare provider for accuracy and completeness. Patient consented to the use of audio recording and use of AI during their visit.

## 2025-06-20 NOTE — PROGRESS NOTES
Patient ID: Humberto Rivas is a 49 y.o. male who presents for Follow-up (DM follow up).  HPI  The patient comes in for follow up.    He was on PSMF for management of type 2 diabetes hypertension hyperlipidemia sleep apnea on CPAP DJD and edema.    He got off track for over a year ago and has been struggling to get back on track.    He states he has been following closer to the program but has not been on track.    He has been working on exercise.    He has had no change in his medical regimen.    Physically he has no complaints.        ROS  Comprehensive review of systems is negative.    Objective   Physical Exam  Visit Vitals  /74      Vitals:    06/20/25 1005   Weight: 141 kg (311 lb)      Body mass index is 48.71 kg/m².      Weight 311 stable down 9 pounds    ENT normal. No adenopathy  Fundi normal  Thyroid palpable and normal. No nodules  Chest clear to auscultation  Heart sounds are normal  Abdomen nontender. Bowel sounds normal. No organomegaly  Feet are okay  Normal vibration and monofilament sensation normal pulses, no lesions  Current Medications[1]    Assessment/Plan     1.  Type 2 diabetes  2.  Hypertension  3.  Hyperlipidemia  4.  Sleep apnea    Will check hemoglobin A1c TSH urine microalbumin in addition to the blood work he is going to be having later in the day through his primary care doctor.    He is going to work to get back on track.  We discussed strategies for success. Planning ahead, taking things a week at a time and planning the week ahead of time.    We again discussed as an option but he wants to see what he can do over the next 3 months.    If he has no further along we will look at adding addendum.    He will follow-up with me in 3 months sooner as needed.             [1]   Current Outpatient Medications   Medication Sig Dispense Refill    atorvastatin (Lipitor) 40 mg tablet Take 1 tablet (40 mg) by mouth once daily. 90 tablet 3    lisinopril 10 mg tablet TAKE 1 TABLET(10 MG) BY  MOUTH EVERY DAY 90 tablet 3    potassium chloride CR 10 mEq ER tablet Take 1 tablet (10 mEq) by mouth 2 times a day. Do not crush, chew, or split. 60 tablet 11     No current facility-administered medications for this visit.

## 2025-06-20 NOTE — PATIENT INSTRUCTIONS
Please take medication as prescribed.  Diet and exercise.  Obtain fasting blood work and urine.  Follow-up in 6 months.  For physical exam.

## 2025-06-21 DIAGNOSIS — E78.49 OTHER HYPERLIPIDEMIA: Primary | ICD-10-CM

## 2025-06-21 LAB
25(OH)D3+25(OH)D2 SERPL-MCNC: 39 NG/ML (ref 30–100)
ALBUMIN SERPL-MCNC: 4.4 G/DL (ref 3.6–5.1)
ALBUMIN/CREAT UR: NORMAL
ALBUMIN/CREAT UR: NORMAL MG/G CREAT
ALP SERPL-CCNC: 77 U/L (ref 36–130)
ALT SERPL-CCNC: 22 U/L (ref 9–46)
ANION GAP SERPL CALCULATED.4IONS-SCNC: 9 MMOL/L (CALC) (ref 7–17)
APPEARANCE UR: CLEAR
AST SERPL-CCNC: 22 U/L (ref 10–40)
BASOPHILS # BLD AUTO: 50 CELLS/UL (ref 0–200)
BASOPHILS NFR BLD AUTO: 0.9 %
BILIRUB SERPL-MCNC: 0.6 MG/DL (ref 0.2–1.2)
BILIRUB UR QL STRIP: NEGATIVE
BUN SERPL-MCNC: 15 MG/DL (ref 7–25)
CALCIUM SERPL-MCNC: 9.1 MG/DL (ref 8.6–10.3)
CHLORIDE SERPL-SCNC: 106 MMOL/L (ref 98–110)
CHOLEST SERPL-MCNC: 136 MG/DL
CHOLEST/HDLC SERPL: 2.8 (CALC)
CO2 SERPL-SCNC: 23 MMOL/L (ref 20–32)
COLOR UR: YELLOW
CREAT SERPL-MCNC: 0.73 MG/DL (ref 0.6–1.29)
CREAT UR-MCNC: 136 MG/DL (ref 20–320)
CREAT UR-MCNC: NORMAL MG/DL
EGFRCR SERPLBLD CKD-EPI 2021: 112 ML/MIN/1.73M2
EOSINOPHIL # BLD AUTO: 179 CELLS/UL (ref 15–500)
EOSINOPHIL NFR BLD AUTO: 3.2 %
ERYTHROCYTE [DISTWIDTH] IN BLOOD BY AUTOMATED COUNT: 12.7 % (ref 11–15)
EST. AVERAGE GLUCOSE BLD GHB EST-MCNC: 134 MG/DL
EST. AVERAGE GLUCOSE BLD GHB EST-SCNC: 7.4 MMOL/L
GLUCOSE SERPL-MCNC: 98 MG/DL (ref 65–139)
GLUCOSE UR QL STRIP: NEGATIVE
HBA1C MFR BLD: 6.3 %
HCT VFR BLD AUTO: 43.1 % (ref 38.5–50)
HDLC SERPL-MCNC: 49 MG/DL
HGB BLD-MCNC: 14.6 G/DL (ref 13.2–17.1)
HGB UR QL STRIP: NEGATIVE
KETONES UR QL STRIP: NEGATIVE
LDLC SERPL CALC-MCNC: 73 MG/DL (CALC)
LEUKOCYTE ESTERASE UR QL STRIP: NEGATIVE
LYMPHOCYTES # BLD AUTO: 2251 CELLS/UL (ref 850–3900)
LYMPHOCYTES NFR BLD AUTO: 40.2 %
MCH RBC QN AUTO: 33.2 PG (ref 27–33)
MCHC RBC AUTO-ENTMCNC: 33.9 G/DL (ref 32–36)
MCV RBC AUTO: 98 FL (ref 80–100)
MICROALBUMIN UR-MCNC: <0.2 MG/DL
MICROALBUMIN UR-MCNC: NORMAL
MONOCYTES # BLD AUTO: 566 CELLS/UL (ref 200–950)
MONOCYTES NFR BLD AUTO: 10.1 %
NEUTROPHILS # BLD AUTO: 2554 CELLS/UL (ref 1500–7800)
NEUTROPHILS NFR BLD AUTO: 45.6 %
NITRITE UR QL STRIP: NEGATIVE
NONHDLC SERPL-MCNC: 87 MG/DL (CALC)
PH UR STRIP: 6.5 [PH] (ref 5–8)
PLATELET # BLD AUTO: 277 THOUSAND/UL (ref 140–400)
PMV BLD REES-ECKER: 9.6 FL (ref 7.5–12.5)
POTASSIUM SERPL-SCNC: 4.4 MMOL/L (ref 3.5–5.3)
PROT SERPL-MCNC: 6.8 G/DL (ref 6.1–8.1)
PROT UR QL STRIP: NEGATIVE
PSA SERPL-MCNC: 0.22 NG/ML
RBC # BLD AUTO: 4.4 MILLION/UL (ref 4.2–5.8)
SODIUM SERPL-SCNC: 138 MMOL/L (ref 135–146)
SP GR UR STRIP: 1.02 (ref 1–1.03)
TRIGL SERPL-MCNC: 62 MG/DL
TSH SERPL-ACNC: 2.25 MIU/L (ref 0.4–4.5)
TSH SERPL-ACNC: 2.34 MIU/L (ref 0.4–4.5)
URATE SERPL-MCNC: 4.7 MG/DL (ref 4–8)
WBC # BLD AUTO: 5.6 THOUSAND/UL (ref 3.8–10.8)

## 2025-06-21 RX ORDER — ATORVASTATIN CALCIUM 80 MG/1
80 TABLET, FILM COATED ORAL DAILY
Qty: 90 TABLET | Refills: 3 | Status: SHIPPED | OUTPATIENT
Start: 2025-06-21 | End: 2026-07-26

## 2025-06-21 NOTE — PROGRESS NOTES
Subjective   Patient ID: Humberto Rivas is a 49 y.o. male who presents for No chief complaint on file..  History of Present Illness        PMHx, FHx, Social Hx, Surg Hx personally reviewed at this appointment. No pertinent findings and/or changes from prior (if applicable).    ROS: Unless specified above, pt denies wt gain/loss f/c HA LoC CP SOB NVDC. See HPI above, and scanned sheet (if applicable). All other systems are reviewed and are without complaint.     Objective     There were no vitals taken for this visit.     Physical Exam        Lab Results   Component Value Date    WBC 5.6 06/20/2025    HGB 14.6 06/20/2025    HCT 43.1 06/20/2025     06/20/2025    CHOL 136 06/20/2025    TRIG 62 06/20/2025    HDL 49 06/20/2025    ALT 22 06/20/2025    AST 22 06/20/2025     06/20/2025    K 4.4 06/20/2025     06/20/2025    CREATININE 0.73 06/20/2025    BUN 15 06/20/2025    CO2 23 06/20/2025    TSH 2.34 06/20/2025    PSA 0.22 06/20/2025    HGBA1C 6.3 (H) 06/20/2025     par     Current Outpatient Medications   Medication Instructions    atorvastatin (LIPITOR) 40 mg, oral, Daily    lisinopril 10 mg, oral, Daily    potassium chloride CR 10 mEq ER tablet 10 mEq, oral, 2 times daily, Do not crush, chew, or split.        ECG 12 lead  Nsr. No acute changes           Assessment & Plan            Hi Torres MD       This medical note was created with the assistance of artificial intelligence (AI) for documentation purposes. The content has been reviewed and confirmed by the healthcare provider for accuracy and completeness. Patient consented to the use of audio recording and use of AI during their visit.

## 2025-06-24 LAB
CREAT UR-MCNC: NORMAL MG/DL
EST. AVERAGE GLUCOSE BLD GHB EST-MCNC: 134 MG/DL
EST. AVERAGE GLUCOSE BLD GHB EST-SCNC: 7.4 MMOL/L
HBA1C MFR BLD: 6.3 %
MICROALBUMIN UR-MCNC: NORMAL MG/DL
TSH SERPL-ACNC: 2.34 MIU/L (ref 0.4–4.5)

## 2025-11-21 ENCOUNTER — APPOINTMENT (OUTPATIENT)
Dept: PRIMARY CARE | Facility: CLINIC | Age: 50
End: 2025-11-21
Payer: COMMERCIAL

## 2025-11-21 ENCOUNTER — APPOINTMENT (OUTPATIENT)
Dept: ENDOCRINOLOGY | Facility: CLINIC | Age: 50
End: 2025-11-21
Payer: COMMERCIAL